# Patient Record
Sex: FEMALE | Race: WHITE | NOT HISPANIC OR LATINO | Employment: FULL TIME | URBAN - METROPOLITAN AREA
[De-identification: names, ages, dates, MRNs, and addresses within clinical notes are randomized per-mention and may not be internally consistent; named-entity substitution may affect disease eponyms.]

---

## 2018-03-09 ENCOUNTER — OFFICE VISIT (OUTPATIENT)
Dept: FAMILY MEDICINE | Facility: CLINIC | Age: 38
End: 2018-03-09
Payer: COMMERCIAL

## 2018-03-09 VITALS
HEIGHT: 63 IN | WEIGHT: 142 LBS | HEART RATE: 80 BPM | DIASTOLIC BLOOD PRESSURE: 66 MMHG | TEMPERATURE: 98.2 F | SYSTOLIC BLOOD PRESSURE: 118 MMHG | OXYGEN SATURATION: 99 % | BODY MASS INDEX: 25.16 KG/M2

## 2018-03-09 DIAGNOSIS — J18.9 PNEUMONIA OF LEFT LOWER LOBE DUE TO INFECTIOUS ORGANISM: Primary | ICD-10-CM

## 2018-03-09 PROCEDURE — 99213 OFFICE O/P EST LOW 20 MIN: CPT | Performed by: PHYSICIAN ASSISTANT

## 2018-03-09 RX ORDER — AZITHROMYCIN 250 MG/1
TABLET, FILM COATED ORAL
Qty: 6 TABLET | Refills: 0 | Status: SHIPPED | OUTPATIENT
Start: 2018-03-09 | End: 2018-11-13

## 2018-03-09 RX ORDER — ALBUTEROL SULFATE 90 UG/1
2 AEROSOL, METERED RESPIRATORY (INHALATION) EVERY 6 HOURS PRN
Qty: 1 INHALER | Refills: 0 | Status: SHIPPED | OUTPATIENT
Start: 2018-03-09 | End: 2018-11-13

## 2018-03-09 NOTE — NURSING NOTE
"Chief Complaint   Patient presents with     Cough       Initial /66  Pulse 80  Temp 98.2  F (36.8  C) (Tympanic)  Ht 5' 3\" (1.6 m)  Wt 142 lb (64.4 kg)  LMP 12/17/2013  SpO2 99%  BMI 25.15 kg/m2 Estimated body mass index is 25.15 kg/(m^2) as calculated from the following:    Height as of this encounter: 5' 3\" (1.6 m).    Weight as of this encounter: 142 lb (64.4 kg).  Medication Reconciliation: complete     Chuy Sr CMA    "

## 2018-03-09 NOTE — MR AVS SNAPSHOT
"              After Visit Summary   3/9/2018    Martha Cruz    MRN: [id number]           Patient Information     Date Of Birth          1980        Visit Information        Provider Department      3/9/2018 1:00 PM Shawna Lugo PA-C [facility]        Today's Diagnoses     Pneumonia of left lower lobe due to infectious organism (H)    -  1       Follow-ups after your visit        Who to contact     Normal or non-critical lab and imaging results will be communicated to you by mGaadihart, letter or phone within 4 business days after the clinic has received the results. If you do not hear from us within 7 days, please contact the clinic through [employer] Direct[employer] or phone. If you have a critical or abnormal lab result, we will notify you by phone as soon as possible.  Submit refill requests through Codewars or call your pharmacy and they will forward the refill request to us. Please allow 3 business days for your refill to be completed.          If you need to speak with a  for additional information , please call: [phone]             Additional Information About Your Visit        mGaadiharShweeb Information     Codewars gives you secure access to your electronic health record. If you see a primary care provider, you can also send messages to your care team and make appointments. If you have questions, please call your primary care clinic.  If you do not have a primary care provider, please call [phone] and they will assist you.        Care EveryWhere ID     This is your Care EveryWhere ID. This could be used by other organizations to access your [facility] medical records  [id number]        Your Vitals Were     Pulse Temperature Height Last Period Pulse Oximetry BMI (Body Mass Index)    80 98.2  F (36.8  C) (Tympanic) 5' 3\" (1.6 m) 12/17/2013 99% 25.15 kg/m2       Blood Pressure from Last 3 Encounters:   03/09/18 118/66   12/01/16 140/89   10/31/16 130/81    Weight from Last 3 " Encounters:   03/09/18 142 lb (64.4 kg)   12/01/16 132 lb (59.9 kg)   10/31/16 130 lb 3.2 oz (59.1 kg)              Today, you had the following     No orders found for display         Today's Medication Changes          These changes are accurate as of 3/9/18  1:19 PM.  If you have any questions, ask your nurse or doctor.               Start taking these medicines.        Dose/Directions    albuterol 108 (90 BASE) MCG/ACT Inhaler   Commonly known as:  PROAIR HFA/PROVENTIL HFA/VENTOLIN HFA   Used for:  Pneumonia of left lower lobe due to infectious organism (H)   Started by:  Shawna Lugo PA-C        Dose:  2 puff   Inhale 2 puffs into the lungs every 6 hours as needed for shortness of breath / dyspnea or wheezing   Quantity:  1 Inhaler   Refills:  0       azithromycin 250 MG tablet   Commonly known as:  ZITHROMAX   Used for:  Pneumonia of left lower lobe due to infectious organism (H)   Started by:  Shawna Lugo PA-C        Two tablets first day, then one tablet daily for four days.   Quantity:  6 tablet   Refills:  0            Where to get your medicines      These medications were sent to [facility] [address] [address] [facility]  [address] [facility] [address]     Phone:  [phone]     albuterol 108 (90 BASE) MCG/ACT Inhaler    azithromycin 250 MG tablet                Primary Care Provider Office Phone # Fax #    [facility] [phone]       [address] [facility] [address]        Equal Access to Services     MARY BENTON AH: Hadii aad ku hadasho Soomaali, waaxda luqadaha, qaybta kaalmada adeegyada, dexter myrick. So [facility] [phone].    ATENCIÓN: Si habla español, tiene a espino disposición servicios gratuitos de asistencia lingüística. Llame al [phone].    We comply with applicable federal civil rights laws and [place] laws. We do not discriminate on the basis of race, color, national  origin, age, disability, sex, sexual orientation, or gender identity.            Thank you!     Thank you for choosing [facility]  for your care. Our goal is always to provide you with excellent care. Hearing back from our patients is one way we can continue to improve our services. Please take a few minutes to complete the written survey that you may receive in the mail after your visit with us. Thank you!             Your Updated Medication List - Protect others around you: Learn how to safely use, store and throw away your medicines at www.disposemymeds.org.          This list is accurate as of 3/9/18  1:19 PM.  Always use your most recent med list.                   Brand Name Dispense Instructions for use Diagnosis    albuterol 108 (90 BASE) MCG/ACT Inhaler    PROAIR HFA/PROVENTIL HFA/VENTOLIN HFA    1 Inhaler    Inhale 2 puffs into the lungs every 6 hours as needed for shortness of breath / dyspnea or wheezing    Pneumonia of left lower lobe due to infectious organism (H)       azithromycin 250 MG tablet    ZITHROMAX    6 tablet    Two tablets first day, then one tablet daily for four days.    Pneumonia of left lower lobe due to infectious organism (H)

## 2018-03-09 NOTE — PROGRESS NOTES
"  SUBJECTIVE:   Martha Cruz is a 37 year old female who presents to clinic today for the following health issues:      ENT Symptoms             Symptoms: cc Present Absent Comment   Fever/Chills   x A few days ago, fever and chills    Fatigue  x     Muscle Aches   x    Eye Irritation   x    Sneezing  x     Nasal Morales/Drg  x  On and off    Sinus Pressure/Pain   x    Loss of smell   x    Dental pain   x    Sore Throat  x     Swollen Glands   x    Ear Pain/Fullness   x    Cough  x     Wheeze  x     Chest Pain   x Burning    Shortness of breath  x     Rash   x    Other  x  Headache      Symptom duration:  Wednesday 3/7   Symptom severity:  moderate    Treatments tried:  Advil and Advil PM    Contacts:  None       Hit acutely on Tuesday morning  Temp 101  Body aches  Cough - although not severe  Chest feels like it's \"burning\"   Trying to rest as much as possible but had an 11 hour surgery the other day      Problem list and histories reviewed & adjusted, as indicated.  Additional history: as documented    No current outpatient prescriptions on file.     Allergies   Allergen Reactions     Nkda [No Known Drug Allergies]        Reviewed and updated as needed this visit by clinical staff       Reviewed and updated as needed this visit by Provider         ROS:  Remainder of ROS obtained and found to be negative other than that which was documented above      OBJECTIVE:     /66  Pulse 80  Temp 98.2  F (36.8  C) (Tympanic)  Ht 5' 3\" (1.6 m)  Wt 142 lb (64.4 kg)  LMP 12/17/2013  SpO2 99%  BMI 25.15 kg/m2  Body mass index is 25.15 kg/(m^2).  GENERAL: healthy, alert and no distress  EYES: Eyes grossly normal to inspection  HENT: ear canals and TM's normal, nose and mouth without ulcers or lesions  NECK: no adenopathy  RESP: fine crackles noted in the LLL, otherwise good breath sounds throughout  CV: regular rates and rhythm, normal S1 S2, no S3 or S4 and no murmur, click or rub    Diagnostic Test Results:  none "     ASSESSMENT/PLAN:     (J18.1) Pneumonia of left lower lobe due to infectious organism (H)  (primary encounter diagnosis)  Comment:   Plan: azithromycin (ZITHROMAX) 250 MG tablet,         albuterol (PROAIR HFA/PROVENTIL HFA/VENTOLIN         HFA) 108 (90 BASE) MCG/ACT Inhaler                Shawna Lugo PA-C  [facility]

## 2018-11-12 ASSESSMENT — ENCOUNTER SYMPTOMS
HEMATOCHEZIA: 0
NERVOUS/ANXIOUS: 1
HEADACHES: 1
SORE THROAT: 0
FEVER: 0
ABDOMINAL PAIN: 0
SHORTNESS OF BREATH: 0
ARTHRALGIAS: 0
BREAST MASS: 1
HEARTBURN: 1
DIARRHEA: 0
CONSTIPATION: 0
COUGH: 0
NAUSEA: 0
JOINT SWELLING: 0
CHILLS: 0
FREQUENCY: 0
DIZZINESS: 1
MYALGIAS: 0
PALPITATIONS: 0
HEMATURIA: 0
EYE PAIN: 0
WEAKNESS: 0
PARESTHESIAS: 0

## 2018-11-13 ENCOUNTER — OFFICE VISIT (OUTPATIENT)
Dept: FAMILY MEDICINE | Facility: CLINIC | Age: 38
End: 2018-11-13
Payer: COMMERCIAL

## 2018-11-13 VITALS
BODY MASS INDEX: 25.55 KG/M2 | TEMPERATURE: 97.3 F | WEIGHT: 144.2 LBS | HEART RATE: 64 BPM | DIASTOLIC BLOOD PRESSURE: 84 MMHG | SYSTOLIC BLOOD PRESSURE: 132 MMHG | HEIGHT: 63 IN

## 2018-11-13 DIAGNOSIS — H91.90 DECREASED HEARING, UNSPECIFIED LATERALITY: Primary | ICD-10-CM

## 2018-11-13 DIAGNOSIS — R12 HEARTBURN: ICD-10-CM

## 2018-11-13 DIAGNOSIS — N64.59 BREAST THICKENING: ICD-10-CM

## 2018-11-13 DIAGNOSIS — Z13.6 CARDIOVASCULAR SCREENING; LDL GOAL LESS THAN 160: ICD-10-CM

## 2018-11-13 DIAGNOSIS — Z00.01 ENCOUNTER FOR ROUTINE ADULT MEDICAL EXAM WITH ABNORMAL FINDINGS: ICD-10-CM

## 2018-11-13 LAB
CHOLEST SERPL-MCNC: 206 MG/DL
HDLC SERPL-MCNC: 59 MG/DL
LDLC SERPL CALC-MCNC: 132 MG/DL
NONHDLC SERPL-MCNC: 147 MG/DL
TRIGL SERPL-MCNC: 75 MG/DL

## 2018-11-13 PROCEDURE — 99213 OFFICE O/P EST LOW 20 MIN: CPT | Mod: 25 | Performed by: FAMILY MEDICINE

## 2018-11-13 PROCEDURE — 99396 PREV VISIT EST AGE 40-64: CPT | Performed by: FAMILY MEDICINE

## 2018-11-13 PROCEDURE — 80061 LIPID PANEL: CPT | Performed by: FAMILY MEDICINE

## 2018-11-13 PROCEDURE — 36415 COLL VENOUS BLD VENIPUNCTURE: CPT | Performed by: FAMILY MEDICINE

## 2018-11-13 ASSESSMENT — PAIN SCALES - GENERAL: PAINLEVEL: NO PAIN (0)

## 2018-11-13 NOTE — PATIENT INSTRUCTIONS
Preventive Health Recommendations  Female Ages 40 to 49    Yearly exam:     See your health care provider every year in order to  1. Review health changes.   2. Discuss preventive care.    3. Review your medicines if your doctor prescribed any.      Get a Pap test every three years (unless you have an abnormal result and your provider advises testing more often).      If you get Pap tests with HPV test, you only need to test every 5 years, unless you have an abnormal result. You do not need a Pap test if your uterus was removed (hysterectomy) and you have not had cancer.      You should be tested each year for STDs (sexually transmitted diseases), if you're at risk.     Ask your doctor if you should have a mammogram.      Have a colonoscopy (test for colon cancer) if someone in your family has had colon cancer or polyps before age 50.       Have a cholesterol test every 5 years.       Have a diabetes test (fasting glucose) after age 45. If you are at risk for diabetes, you should have this test every 3 years.    Shots: Get a flu shot each year. Get a tetanus shot every 10 years.     Nutrition:     Eat at least 5 servings of fruits and vegetables each day.    Eat whole-grain bread, whole-wheat pasta and brown rice instead of white grains and rice.    Get adequate Calcium and Vitamin D.      Lifestyle    Exercise at least 150 minutes a week (an average of 30 minutes a day, 5 days a week). This will help you control your weight and prevent disease.    Limit alcohol to one drink per day.    No smoking.     Wear sunscreen to prevent skin cancer.    See your dentist every six months for an exam and cleaning.        Make the appointment for the breast exams  You should know the same day what the next steps are  Call for the audiology appointment and wear the ear protection to keep the hearing you have.

## 2018-11-13 NOTE — MR AVS SNAPSHOT
After Visit Summary   11/13/2018    Martha Cruz    MRN: [id number]           Patient Information     Date Of Birth          1980        Visit Information        Provider Department      11/13/2018 8:00 AM Amairani Sanchez MD [facility]        Today's Diagnoses     Decreased hearing, unspecified laterality    -  1    Routine general medical examination at a health care facility        Encounter for routine adult medical exam with abnormal findings        CARDIOVASCULAR SCREENING; LDL GOAL LESS THAN 160        Breast thickening          Care Instructions      Preventive Health Recommendations  Female Ages 40 to 49    Yearly exam:     See your health care provider every year in order to  1. Review health changes.   2. Discuss preventive care.    3. Review your medicines if your doctor prescribed any.      Get a Pap test every three years (unless you have an abnormal result and your provider advises testing more often).      If you get Pap tests with HPV test, you only need to test every 5 years, unless you have an abnormal result. You do not need a Pap test if your uterus was removed (hysterectomy) and you have not had cancer.      You should be tested each year for STDs (sexually transmitted diseases), if you're at risk.     Ask your doctor if you should have a mammogram.      Have a colonoscopy (test for colon cancer) if someone in your family has had colon cancer or polyps before age 50.       Have a cholesterol test every 5 years.       Have a diabetes test (fasting glucose) after age 45. If you are at risk for diabetes, you should have this test every 3 years.    Shots: Get a flu shot each year. Get a tetanus shot every 10 years.     Nutrition:     Eat at least 5 servings of fruits and vegetables each day.    Eat whole-grain bread, whole-wheat pasta and brown rice instead of white grains and rice.    Get adequate Calcium and Vitamin D.      Lifestyle    Exercise at least 150  minutes a week (an average of 30 minutes a day, 5 days a week). This will help you control your weight and prevent disease.    Limit alcohol to one drink per day.    No smoking.     Wear sunscreen to prevent skin cancer.    See your dentist every six months for an exam and cleaning.        Make the appointment for the breast exams  You should know the same day what the next steps are  Call for the audiology appointment and wear the ear protection to keep the hearing you have.           Follow-ups after your visit        Additional Services     AUDIOLOGY ADULT REFERRAL       Your provider has referred you to: [facility] [phone]   [email or web address]    Specialty Testing:  Hearing Aid Consultation            OTOLARYNGOLOGY REFERRAL       Your provider has referred you to: [facility]: [facility] [phone]   [email or web address]    Please be aware that coverage of these services is subject to the terms and limitations of your health insurance plan.  Call member services at your health plan with any benefit or coverage questions.      Please bring the following with you to your appointment:    (1) Any X-Rays, CTs or MRIs which have been performed.  Contact the facility where they were done to arrange for  prior to your scheduled appointment.   (2) List of current medications  (3) This referral request   (4) Any documents/labs given to you for this referral                  Future tests that were ordered for you today     Open Future Orders        Priority Expected Expires Ordered    US Breast Left Complete 4 Quadrants Routine  11/13/2019 11/13/2018    MA Diagnostic Digital Bilateral Routine  11/13/2019 11/13/2018            Who to contact     Normal or non-critical lab and imaging results will be communicated to you by MyChart, letter or phone within 4 business days after the clinic has received the results.  "If you do not hear from us within 7 days, please contact the clinic through [employer] or phone. If you have a critical or abnormal lab result, we will notify you by phone as soon as possible.  Submit refill requests through [employer]ComLink or call your pharmacy and they will forward the refill request to us. Please allow 3 business days for your refill to be completed.          If you need to speak with a  for additional information , please call: [phone]             Additional Information About Your Visit        NewComLink Information     [employer]Link gives you secure access to your electronic health record. If you see a primary care provider, you can also send messages to your care team and make appointments. If you have questions, please call your primary care clinic.  If you do not have a primary care provider, please call [phone] and they will assist you.        Care EveryWhere ID     This is your Care EveryWhere ID. This could be used by other organizations to access your [facility] medical records  [id number]        Your Vitals Were     Pulse Temperature Height Last Period BMI (Body Mass Index)       64 97.3  F (36.3  C) (Tympanic) 5' 3\" (1.6 m) 12/17/2013 25.54 kg/m2        Blood Pressure from Last 3 Encounters:   11/13/18 132/84   03/09/18 118/66   12/01/16 140/89    Weight from Last 3 Encounters:   11/13/18 144 lb 3.2 oz (65.4 kg)   03/09/18 142 lb (64.4 kg)   12/01/16 132 lb (59.9 kg)              We Performed the Following     AUDIOLOGY ADULT REFERRAL     Lipid panel reflex to direct LDL Fasting     OTOLARYNGOLOGY REFERRAL        Primary Care Provider Office Phone # Fax #    [facility] [phone] [address] [facility] [address]        Equal Access to Services     MARY BENTON : Nancy Lomax, wakelly coker, qaybta kaalmamelba florentino, dexter myrick. So [facility] [phone].    ATENCIÓN: Si adonay nicholas " disposición servicios gratuitos de asistencia lingüística. Violetta rodriguez [phone].    We comply with applicable federal civil rights laws and [place] laws. We do not discriminate on the basis of race, color, national origin, age, disability, sex, sexual orientation, or gender identity.            Thank you!     Thank you for choosing [facility]  for your care. Our goal is always to provide you with excellent care. Hearing back from our patients is one way we can continue to improve our services. Please take a few minutes to complete the written survey that you may receive in the mail after your visit with us. Thank you!             Your Updated Medication List - Protect others around you: Learn how to safely use, store and throw away your medicines at www.disposemymeds.org.      Notice  As of 11/13/2018  8:31 AM    You have not been prescribed any medications.
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
## 2018-11-14 NOTE — PROGRESS NOTES
Martha,  Your lab results were normal your bad cholesterol is ok and the good is ok too. Please feel free to my chart or call the office with questions. Amairani Sanchez M.D.

## 2019-01-02 ENCOUNTER — HOSPITAL ENCOUNTER (OUTPATIENT)
Dept: ULTRASOUND IMAGING | Facility: CLINIC | Age: 39
End: 2019-01-02
Attending: FAMILY MEDICINE
Payer: COMMERCIAL

## 2019-01-02 ENCOUNTER — HOSPITAL ENCOUNTER (OUTPATIENT)
Dept: MAMMOGRAPHY | Facility: CLINIC | Age: 39
Discharge: HOME | End: 2019-01-02
Attending: FAMILY MEDICINE | Admitting: FAMILY MEDICINE
Payer: COMMERCIAL

## 2019-01-02 DIAGNOSIS — N64.59 BREAST THICKENING: ICD-10-CM

## 2019-01-02 PROCEDURE — 76642 ULTRASOUND BREAST LIMITED: CPT | Mod: LT

## 2019-01-02 PROCEDURE — 77066 DX MAMMO INCL CAD BI: CPT

## 2019-01-02 PROCEDURE — G0279 TOMOSYNTHESIS, MAMMO: HCPCS

## 2019-06-26 ENCOUNTER — APPOINTMENT (OUTPATIENT)
Dept: CT IMAGING | Facility: CLINIC | Age: 39
End: 2019-06-26
Attending: EMERGENCY MEDICINE
Payer: COMMERCIAL

## 2019-06-26 ENCOUNTER — HOSPITAL ENCOUNTER (EMERGENCY)
Facility: CLINIC | Age: 39
Discharge: HOME | End: 2019-06-26
Attending: EMERGENCY MEDICINE | Admitting: EMERGENCY MEDICINE
Payer: COMMERCIAL

## 2019-06-26 ENCOUNTER — APPOINTMENT (OUTPATIENT)
Dept: MRI IMAGING | Facility: CLINIC | Age: 39
End: 2019-06-26
Attending: EMERGENCY MEDICINE
Payer: COMMERCIAL

## 2019-06-26 VITALS
SYSTOLIC BLOOD PRESSURE: 132 MMHG | DIASTOLIC BLOOD PRESSURE: 82 MMHG | HEART RATE: 83 BPM | RESPIRATION RATE: 18 BRPM | OXYGEN SATURATION: 98 % | TEMPERATURE: 100 F

## 2019-06-26 DIAGNOSIS — R42 LIGHTHEADEDNESS: ICD-10-CM

## 2019-06-26 DIAGNOSIS — H53.9 VISUAL DISTURBANCE: ICD-10-CM

## 2019-06-26 DIAGNOSIS — R90.89 ABNORMAL BRAIN MRI: ICD-10-CM

## 2019-06-26 LAB
ANION GAP SERPL CALCULATED.3IONS-SCNC: 6 MMOL/L (ref 3–14)
BASOPHILS # BLD AUTO: 0 10E9/L (ref 0–0.2)
BASOPHILS NFR BLD AUTO: 0.6 %
BUN SERPL-MCNC: 14 MG/DL (ref 7–30)
CALCIUM SERPL-MCNC: 8.8 MG/DL (ref 8.5–10.1)
CHLORIDE SERPL-SCNC: 109 MMOL/L (ref 94–109)
CO2 SERPL-SCNC: 25 MMOL/L (ref 20–32)
CREAT SERPL-MCNC: 0.63 MG/DL (ref 0.52–1.04)
DIFFERENTIAL METHOD BLD: NORMAL
EOSINOPHIL # BLD AUTO: 0.1 10E9/L (ref 0–0.7)
EOSINOPHIL NFR BLD AUTO: 1 %
ERYTHROCYTE [DISTWIDTH] IN BLOOD BY AUTOMATED COUNT: 13 % (ref 10–15)
GFR SERPL CREATININE-BSD FRML MDRD: >90 ML/MIN/{1.73_M2}
GLUCOSE SERPL-MCNC: 106 MG/DL (ref 70–99)
HCG UR QL: NEGATIVE
HCT VFR BLD AUTO: 41.1 % (ref 35–47)
HGB BLD-MCNC: 13.6 G/DL (ref 11.7–15.7)
IMM GRANULOCYTES # BLD: 0 10E9/L (ref 0–0.4)
IMM GRANULOCYTES NFR BLD: 0.2 %
LYMPHOCYTES # BLD AUTO: 1.2 10E9/L (ref 0.8–5.3)
LYMPHOCYTES NFR BLD AUTO: 25 %
MCH RBC QN AUTO: 30.3 PG (ref 26.5–33)
MCHC RBC AUTO-ENTMCNC: 33.1 G/DL (ref 31.5–36.5)
MCV RBC AUTO: 92 FL (ref 78–100)
MONOCYTES # BLD AUTO: 0.5 10E9/L (ref 0–1.3)
MONOCYTES NFR BLD AUTO: 9.3 %
NEUTROPHILS # BLD AUTO: 3.1 10E9/L (ref 1.6–8.3)
NEUTROPHILS NFR BLD AUTO: 63.9 %
NRBC # BLD AUTO: 0 10*3/UL
NRBC BLD AUTO-RTO: 0 /100
PLATELET # BLD AUTO: 235 10E9/L (ref 150–450)
POTASSIUM SERPL-SCNC: 3.8 MMOL/L (ref 3.4–5.3)
RBC # BLD AUTO: 4.49 10E12/L (ref 3.8–5.2)
SODIUM SERPL-SCNC: 140 MMOL/L (ref 133–144)
WBC # BLD AUTO: 4.8 10E9/L (ref 4–11)

## 2019-06-26 PROCEDURE — 96360 HYDRATION IV INFUSION INIT: CPT | Mod: 59 | Performed by: EMERGENCY MEDICINE

## 2019-06-26 PROCEDURE — 80048 BASIC METABOLIC PNL TOTAL CA: CPT | Performed by: EMERGENCY MEDICINE

## 2019-06-26 PROCEDURE — 93005 ELECTROCARDIOGRAM TRACING: CPT | Performed by: EMERGENCY MEDICINE

## 2019-06-26 PROCEDURE — 93010 ELECTROCARDIOGRAM REPORT: CPT | Mod: Z6 | Performed by: EMERGENCY MEDICINE

## 2019-06-26 PROCEDURE — 70553 MRI BRAIN STEM W/O & W/DYE: CPT

## 2019-06-26 PROCEDURE — A9585 GADOBUTROL INJECTION: HCPCS | Performed by: EMERGENCY MEDICINE

## 2019-06-26 PROCEDURE — 70498 CT ANGIOGRAPHY NECK: CPT

## 2019-06-26 PROCEDURE — 70450 CT HEAD/BRAIN W/O DYE: CPT | Mod: XS

## 2019-06-26 PROCEDURE — 25000128 H RX IP 250 OP 636: Performed by: EMERGENCY MEDICINE

## 2019-06-26 PROCEDURE — 85025 COMPLETE CBC W/AUTO DIFF WBC: CPT | Performed by: EMERGENCY MEDICINE

## 2019-06-26 PROCEDURE — 25000125 ZZHC RX 250: Performed by: EMERGENCY MEDICINE

## 2019-06-26 PROCEDURE — 25500064 ZZH RX 255 OP 636: Performed by: EMERGENCY MEDICINE

## 2019-06-26 PROCEDURE — 81025 URINE PREGNANCY TEST: CPT | Performed by: EMERGENCY MEDICINE

## 2019-06-26 PROCEDURE — 25000132 ZZH RX MED GY IP 250 OP 250 PS 637: Performed by: EMERGENCY MEDICINE

## 2019-06-26 PROCEDURE — 99285 EMERGENCY DEPT VISIT HI MDM: CPT | Mod: 25 | Performed by: EMERGENCY MEDICINE

## 2019-06-26 RX ORDER — GADOBUTROL 604.72 MG/ML
6 INJECTION INTRAVENOUS ONCE
Status: COMPLETED | OUTPATIENT
Start: 2019-06-26 | End: 2019-06-26

## 2019-06-26 RX ORDER — ACETAMINOPHEN 325 MG/1
650 TABLET ORAL ONCE
Status: COMPLETED | OUTPATIENT
Start: 2019-06-26 | End: 2019-06-26

## 2019-06-26 RX ORDER — IOPAMIDOL 755 MG/ML
70 INJECTION, SOLUTION INTRAVASCULAR ONCE
Status: COMPLETED | OUTPATIENT
Start: 2019-06-26 | End: 2019-06-26

## 2019-06-26 RX ADMIN — SODIUM CHLORIDE 1000 ML: 9 INJECTION, SOLUTION INTRAVENOUS at 10:15

## 2019-06-26 RX ADMIN — IOPAMIDOL 70 ML: 755 INJECTION, SOLUTION INTRAVENOUS at 10:47

## 2019-06-26 RX ADMIN — ACETAMINOPHEN 650 MG: 325 TABLET, FILM COATED ORAL at 11:39

## 2019-06-26 RX ADMIN — GADOBUTROL 6 ML: 604.72 INJECTION INTRAVENOUS at 12:20

## 2019-06-26 RX ADMIN — SODIUM CHLORIDE 100 ML: 9 INJECTION, SOLUTION INTRAVENOUS at 10:47

## 2019-06-26 ASSESSMENT — ENCOUNTER SYMPTOMS
RESPIRATORY NEGATIVE: 1
NUMBNESS: 1
PSYCHIATRIC NEGATIVE: 1
EYES NEGATIVE: 1
CARDIOVASCULAR NEGATIVE: 1
ALLERGIC/IMMUNOLOGIC NEGATIVE: 1
MUSCULOSKELETAL NEGATIVE: 1
HEMATOLOGIC/LYMPHATIC NEGATIVE: 1
ENDOCRINE NEGATIVE: 1
HEADACHES: 1
CONSTITUTIONAL NEGATIVE: 1
GASTROINTESTINAL NEGATIVE: 1
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
## 2019-06-26 NOTE — ED AVS SNAPSHOT
[facility] Emergency Department  [address] [address]  Phone:  [phone]  Fax:  [phone]                                    Martha Cruz   MRN: [id number]    Department:  [facility] Emergency Department   Date of Visit:  6/26/2019           After Visit Summary Signature Page    I have received my discharge instructions, and my questions have been answered. I have discussed any challenges I see with this plan with the nurse or doctor.    ..........................................................................................................................................  Patient/Patient Representative Signature      ..........................................................................................................................................  Patient Representative Print Name and Relationship to Patient    ..................................................               ................................................  Date                                   Time    ..........................................................................................................................................  Reviewed by Signature/Title    ...................................................              ..............................................  Date                                               Time          22EPIC Rev 08/18

## 2019-06-26 NOTE — ED NOTES
Driving to work at 0710 and felt lightheaded-arms weak and hard to control-loss of vision -these sx lasted 10 seconds-also felt tingly all over-now left facial tingling and feels faint with quick movements

## 2019-06-26 NOTE — LETTER
June 26, 2019      To Whom It May Concern:      Martha Cruz was seen in our Emergency Department today, 06/26/19.  Please excuse her from missing work due to her visit in the emergency department.  Follow-up care with her primary care and treating provider is recommended.  If her symptoms recur or worsen she is to return to the emergency department for reevaluation additional care      Sincerely,      Christos Varela MD, FACEP

## 2019-07-02 ENCOUNTER — TRANSFERRED RECORDS (OUTPATIENT)
Dept: HEALTH INFORMATION MANAGEMENT | Facility: CLINIC | Age: 39
End: 2019-07-02

## 2019-07-16 ENCOUNTER — TRANSFERRED RECORDS (OUTPATIENT)
Dept: HEALTH INFORMATION MANAGEMENT | Facility: CLINIC | Age: 39
End: 2019-07-16

## 2019-08-23 ENCOUNTER — AMBULATORY - HEALTHEAST (OUTPATIENT)
Dept: NEUROSURGERY | Facility: CLINIC | Age: 39
End: 2019-08-23

## 2019-08-23 ENCOUNTER — RECORDS - HEALTHEAST (OUTPATIENT)
Dept: RADIOLOGY | Facility: CLINIC | Age: 39
End: 2019-08-23

## 2019-08-23 ENCOUNTER — RECORDS - HEALTHEAST (OUTPATIENT)
Dept: ADMINISTRATIVE | Facility: OTHER | Age: 39
End: 2019-08-23

## 2019-08-27 ENCOUNTER — OFFICE VISIT - HEALTHEAST (OUTPATIENT)
Dept: NEUROSURGERY | Facility: CLINIC | Age: 39
End: 2019-08-27

## 2019-08-27 ENCOUNTER — TRANSFERRED RECORDS (OUTPATIENT)
Dept: HEALTH INFORMATION MANAGEMENT | Facility: CLINIC | Age: 39
End: 2019-08-27

## 2019-08-27 DIAGNOSIS — Q28.3 CAVERNOUS MALFORMATION: ICD-10-CM

## 2019-08-27 ASSESSMENT — MIFFLIN-ST. JEOR: SCORE: 1270.97

## 2019-10-09 ENCOUNTER — TRANSFERRED RECORDS (OUTPATIENT)
Dept: HEALTH INFORMATION MANAGEMENT | Facility: CLINIC | Age: 39
End: 2019-10-09

## 2019-11-06 ENCOUNTER — MYC MEDICAL ADVICE (OUTPATIENT)
Dept: FAMILY MEDICINE | Facility: CLINIC | Age: 39
End: 2019-11-06

## 2020-01-16 ENCOUNTER — OFFICE VISIT (OUTPATIENT)
Dept: FAMILY MEDICINE | Facility: CLINIC | Age: 40
End: 2020-01-16
Payer: COMMERCIAL

## 2020-01-16 VITALS
DIASTOLIC BLOOD PRESSURE: 86 MMHG | SYSTOLIC BLOOD PRESSURE: 128 MMHG | WEIGHT: 153.7 LBS | HEART RATE: 78 BPM | HEIGHT: 63 IN | OXYGEN SATURATION: 99 % | BODY MASS INDEX: 27.23 KG/M2 | TEMPERATURE: 98.1 F

## 2020-01-16 DIAGNOSIS — Q28.3 CEREBRAL CAVERNOMA: ICD-10-CM

## 2020-01-16 DIAGNOSIS — Z00.00 ROUTINE GENERAL MEDICAL EXAMINATION AT A HEALTH CARE FACILITY: Primary | ICD-10-CM

## 2020-01-16 PROCEDURE — 99396 PREV VISIT EST AGE 40-64: CPT | Performed by: FAMILY MEDICINE

## 2020-01-16 ASSESSMENT — MIFFLIN-ST. JEOR: SCORE: 1331.31
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
## 2020-02-06 ENCOUNTER — HOSPITAL ENCOUNTER (OUTPATIENT)
Dept: MAMMOGRAPHY | Facility: CLINIC | Age: 40
Discharge: HOME | End: 2020-02-06
Attending: FAMILY MEDICINE | Admitting: FAMILY MEDICINE
Payer: COMMERCIAL

## 2020-02-06 DIAGNOSIS — Z12.31 VISIT FOR SCREENING MAMMOGRAM: ICD-10-CM

## 2020-02-06 PROCEDURE — 77063 BREAST TOMOSYNTHESIS BI: CPT

## 2020-05-13 ENCOUNTER — TRANSFERRED RECORDS (OUTPATIENT)
Dept: HEALTH INFORMATION MANAGEMENT | Facility: CLINIC | Age: 40
End: 2020-05-13

## 2020-05-21 ENCOUNTER — TRANSFERRED RECORDS (OUTPATIENT)
Dept: HEALTH INFORMATION MANAGEMENT | Facility: CLINIC | Age: 40
End: 2020-05-21

## 2020-06-05 ENCOUNTER — TELEPHONE (OUTPATIENT)
Dept: DERMATOLOGY | Facility: CLINIC | Age: 40
End: 2020-06-05

## 2020-06-05 DIAGNOSIS — B86 SCABIES: Primary | ICD-10-CM

## 2020-06-05 RX ORDER — PERMETHRIN 50 MG/G
CREAM TOPICAL ONCE
Qty: 120 G | Refills: 0 | Status: SHIPPED | OUTPATIENT
Start: 2020-06-05 | End: 2020-06-05

## 2020-12-06 ENCOUNTER — HEALTH MAINTENANCE LETTER (OUTPATIENT)
Age: 40
End: 2020-12-06

## 2021-02-02 ASSESSMENT — PATIENT HEALTH QUESTIONNAIRE - PHQ9
10. IF YOU CHECKED OFF ANY PROBLEMS, HOW DIFFICULT HAVE THESE PROBLEMS MADE IT FOR YOU TO DO YOUR WORK, TAKE CARE OF THINGS AT HOME, OR GET ALONG WITH OTHER PEOPLE: SOMEWHAT DIFFICULT
SUM OF ALL RESPONSES TO PHQ QUESTIONS 1-9: 9
SUM OF ALL RESPONSES TO PHQ QUESTIONS 1-9: 9

## 2021-02-02 ASSESSMENT — ENCOUNTER SYMPTOMS
HEARTBURN: 1
WEAKNESS: 0
FEVER: 0
ABDOMINAL PAIN: 0
DIZZINESS: 0
JOINT SWELLING: 0
HEMATURIA: 0
NERVOUS/ANXIOUS: 1
FREQUENCY: 0
COUGH: 0
SHORTNESS OF BREATH: 0
CHILLS: 0
MYALGIAS: 0
PARESTHESIAS: 0
DIARRHEA: 0
EYE PAIN: 0
SORE THROAT: 0
DYSURIA: 0
NAUSEA: 0
HEADACHES: 1
ARTHRALGIAS: 1
HEMATOCHEZIA: 0
CONSTIPATION: 0
PALPITATIONS: 0
BREAST MASS: 0

## 2021-02-03 ASSESSMENT — PATIENT HEALTH QUESTIONNAIRE - PHQ9: SUM OF ALL RESPONSES TO PHQ QUESTIONS 1-9: 9

## 2021-02-09 ENCOUNTER — OFFICE VISIT (OUTPATIENT)
Dept: FAMILY MEDICINE | Facility: CLINIC | Age: 41
End: 2021-02-09
Payer: COMMERCIAL

## 2021-02-09 VITALS
OXYGEN SATURATION: 99 % | WEIGHT: 156 LBS | HEIGHT: 63 IN | BODY MASS INDEX: 27.64 KG/M2 | DIASTOLIC BLOOD PRESSURE: 97 MMHG | TEMPERATURE: 98.2 F | SYSTOLIC BLOOD PRESSURE: 147 MMHG | HEART RATE: 92 BPM

## 2021-02-09 DIAGNOSIS — R12 HEARTBURN: ICD-10-CM

## 2021-02-09 DIAGNOSIS — F43.23 ADJUSTMENT DISORDER WITH MIXED ANXIETY AND DEPRESSED MOOD: ICD-10-CM

## 2021-02-09 DIAGNOSIS — Z00.00 ROUTINE GENERAL MEDICAL EXAMINATION AT A HEALTH CARE FACILITY: Primary | ICD-10-CM

## 2021-02-09 LAB — TSH SERPL DL<=0.005 MIU/L-ACNC: 2.08 MU/L (ref 0.4–4)

## 2021-02-09 PROCEDURE — 36415 COLL VENOUS BLD VENIPUNCTURE: CPT | Performed by: FAMILY MEDICINE

## 2021-02-09 PROCEDURE — 99396 PREV VISIT EST AGE 40-64: CPT | Performed by: FAMILY MEDICINE

## 2021-02-09 PROCEDURE — 84443 ASSAY THYROID STIM HORMONE: CPT | Performed by: FAMILY MEDICINE

## 2021-02-09 PROCEDURE — 99214 OFFICE O/P EST MOD 30 MIN: CPT | Mod: 25 | Performed by: FAMILY MEDICINE

## 2021-02-09 RX ORDER — FLUOXETINE 10 MG/1
10 CAPSULE ORAL DAILY
Qty: 30 CAPSULE | Refills: 3 | Status: SHIPPED | OUTPATIENT
Start: 2021-02-09 | End: 2021-08-02

## 2021-02-09 RX ORDER — OMEPRAZOLE 40 MG/1
40 CAPSULE, DELAYED RELEASE ORAL DAILY
Qty: 30 CAPSULE | Refills: 0 | Status: SHIPPED | OUTPATIENT
Start: 2021-02-09 | End: 2022-04-20

## 2021-02-09 ASSESSMENT — ENCOUNTER SYMPTOMS
NERVOUS/ANXIOUS: 1
SORE THROAT: 0
FREQUENCY: 0
WEAKNESS: 0
BREAST MASS: 0
SHORTNESS OF BREATH: 0
ARTHRALGIAS: 1
DYSURIA: 0
COUGH: 0
JOINT SWELLING: 0
DIZZINESS: 0
DIARRHEA: 0
CONSTIPATION: 0
ABDOMINAL PAIN: 0
PALPITATIONS: 0
HEMATOCHEZIA: 0
NAUSEA: 0
HEADACHES: 1
EYE PAIN: 0
FEVER: 0
CHILLS: 0
HEARTBURN: 1
HEMATURIA: 0
PARESTHESIAS: 0
MYALGIAS: 0

## 2021-02-09 ASSESSMENT — MIFFLIN-ST. JEOR: SCORE: 1336.74
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
## 2021-02-09 NOTE — PATIENT INSTRUCTIONS
Preventive Health Recommendations  Female Ages 40 to 49    Yearly exam:     See your health care provider every year in order to  1. Review health changes.   2. Discuss preventive care.    3. Review your medicines if your doctor prescribed any.      Get a Pap test every three years (unless you have an abnormal result and your provider advises testing more often).      If you get Pap tests with HPV test, you only need to test every 5 years, unless you have an abnormal result. You do not need a Pap test if your uterus was removed (hysterectomy) and you have not had cancer.      You should be tested each year for STDs (sexually transmitted diseases), if you're at risk.     Ask your doctor if you should have a mammogram.      Have a colonoscopy (test for colon cancer) if someone in your family has had colon cancer or polyps before age 50.       Have a cholesterol test every 5 years.       Have a diabetes test (fasting glucose) after age 45. If you are at risk for diabetes, you should have this test every 3 years.    Shots: Get a flu shot each year. Get a tetanus shot every 10 years.     Nutrition:     Eat at least 5 servings of fruits and vegetables each day.    Eat whole-grain bread, whole-wheat pasta and brown rice instead of white grains and rice.    Get adequate Calcium and Vitamin D.      Lifestyle    Exercise at least 150 minutes a week (an average of 30 minutes a day, 5 days a week). This will help you control your weight and prevent disease.    Limit alcohol to one drink per day.    No smoking.     Wear sunscreen to prevent skin cancer.    See your dentist every six months for an exam and cleaning.      Take the prilosec for 2 weeks then try taking just the famotidine

## 2021-02-10 ENCOUNTER — HOSPITAL ENCOUNTER (OUTPATIENT)
Dept: MAMMOGRAPHY | Facility: CLINIC | Age: 41
Discharge: HOME | End: 2021-02-10
Attending: FAMILY MEDICINE
Payer: COMMERCIAL

## 2021-02-10 DIAGNOSIS — Z12.31 VISIT FOR SCREENING MAMMOGRAM: ICD-10-CM

## 2021-02-10 PROCEDURE — 77063 BREAST TOMOSYNTHESIS BI: CPT

## 2021-02-11 NOTE — RESULT ENCOUNTER NOTE
Martha,  Your lab results were normal/stable. Please feel free to my chart or call the office with questions. Amairani Sanchez M.D.

## 2021-05-26 ENCOUNTER — TRANSFERRED RECORDS (OUTPATIENT)
Dept: HEALTH INFORMATION MANAGEMENT | Facility: CLINIC | Age: 41
End: 2021-05-26

## 2021-05-26 ENCOUNTER — RECORDS - HEALTHEAST (OUTPATIENT)
Dept: ADMINISTRATIVE | Facility: CLINIC | Age: 41
End: 2021-05-26

## 2021-05-31 NOTE — PROGRESS NOTES
Dear Dr. Lugo:    I had the pleasure of seeing Martha Cruz in consultation by neurosurgery clinic for headaches and recent episode of tingling in the upper left half of the body and loss of vision.  Left side of the face was numb and tingly lasted all day dull headaches or eyebrows and left eye twitching.  She had an EEG which is negative for seizures she was evaluated by her colleagues in her eye clinic and diagnosed with migraine.  On imaging she was found to have an incidental 4 mm cavernous malformation.    On physical exam patient is very pleasant and appropriate  Speech is clear fluent and appropriate  Face is symmetric throughout the forehead eyes and mouth  Extraocular muscles are intact bilaterally  Tongue and uvula elevate in the midline  Gait is nonantalgic    Assessment and plan:  Martha is a very pleasant right-handed 38-year-old with incidental finding of a cavernous malformation.  This is not the cause of her headaches and reassured them.  I reviewed the natural history of cavernous malformations I will repeat an MRI in 1 year with and without contrast.  If that stable after a year or 2 then we can discuss PRN MRIs versus every other 2 years.   Thank you for giving me the opportunity to see this very pleasant patient.  If you have any questions about Martha or any other patients please feel free to contact me.  Of note I spent greater than 30 minutes counseling the patient regarding her pathology and treatment plan.    Taisha Butt MD, [facility]

## 2021-06-03 VITALS — WEIGHT: 145 LBS | BODY MASS INDEX: 26.68 KG/M2 | HEIGHT: 62 IN

## 2021-08-02 ENCOUNTER — VIRTUAL VISIT (OUTPATIENT)
Dept: FAMILY MEDICINE | Facility: CLINIC | Age: 41
End: 2021-08-02
Payer: COMMERCIAL

## 2021-08-02 DIAGNOSIS — F43.23 ADJUSTMENT DISORDER WITH MIXED ANXIETY AND DEPRESSED MOOD: ICD-10-CM

## 2021-08-02 DIAGNOSIS — Z28.21 COVID-19 VIRUS VACCINATION DECLINED: Primary | ICD-10-CM

## 2021-08-02 PROCEDURE — 99213 OFFICE O/P EST LOW 20 MIN: CPT | Mod: 95 | Performed by: FAMILY MEDICINE

## 2021-08-02 RX ORDER — FLUOXETINE 10 MG/1
10 CAPSULE ORAL DAILY
Qty: 90 CAPSULE | Refills: 3 | Status: SHIPPED | OUTPATIENT
Start: 2021-08-02 | End: 2022-11-15

## 2021-08-02 ASSESSMENT — ANXIETY QUESTIONNAIRES
3. WORRYING TOO MUCH ABOUT DIFFERENT THINGS: SEVERAL DAYS
1. FEELING NERVOUS, ANXIOUS, OR ON EDGE: SEVERAL DAYS
GAD7 TOTAL SCORE: 3
6. BECOMING EASILY ANNOYED OR IRRITABLE: NOT AT ALL
5. BEING SO RESTLESS THAT IT IS HARD TO SIT STILL: NOT AT ALL
2. NOT BEING ABLE TO STOP OR CONTROL WORRYING: SEVERAL DAYS
IF YOU CHECKED OFF ANY PROBLEMS ON THIS QUESTIONNAIRE, HOW DIFFICULT HAVE THESE PROBLEMS MADE IT FOR YOU TO DO YOUR WORK, TAKE CARE OF THINGS AT HOME, OR GET ALONG WITH OTHER PEOPLE: NOT DIFFICULT AT ALL
7. FEELING AFRAID AS IF SOMETHING AWFUL MIGHT HAPPEN: NOT AT ALL

## 2021-08-02 ASSESSMENT — PATIENT HEALTH QUESTIONNAIRE - PHQ9
SUM OF ALL RESPONSES TO PHQ QUESTIONS 1-9: 1
5. POOR APPETITE OR OVEREATING: NOT AT ALL

## 2021-08-02 NOTE — PROGRESS NOTES
"Martha is a 40 year old who is being evaluated via a billable video visit.      How would you like to obtain your AVS?   If the video visit is dropped, the invitation should be resent by:   Will anyone else be joining your video visit? No      Video Start Time: 11:45 AM    Assessment & Plan     Adjustment disorder with mixed anxiety and depressed mood  Will continue this discussed legth of treatment, expected results and follow up   - FLUoxetine (PROZAC) 10 MG capsule; Take 1 capsule (10 mg) by mouth daily    COVID-19 virus vaccination declined  Recommended vaccination.          BMI:   Estimated body mass index is 27.63 kg/m  as calculated from the following:    Height as of 21: 1.6 m (5' 3\").    Weight as of 21: 70.8 kg (156 lb).       Patient Instructions   Take the prozac until next spring. Call if side effects or other changes   rec covid vaccine      No follow-ups on file.    Amairani Sanchez MD  [facility]    Stef Thomas is a 40 year old who presents for the following health issues     HPI     Depression and Anxiety Follow-Up    How are you doing with your depression since your last visit? Improved     How are you doing with your anxiety since your last visit?  Improved     Are you having other symptoms that might be associated with depression or anxiety? No    Have you had a significant life event? No     Do you have any concerns with your use of alcohol or other drugs? No    Social History     Tobacco Use     Smoking status: Former Smoker     Packs/day: 0.50     Years: 1.00     Pack years: 0.50     Types: Cigarettes     Start date: 1997     Quit date: 1998     Years since quittin.1     Smokeless tobacco: Former User   Substance Use Topics     Alcohol use: Yes     Comment: every couple days I have one cocktail     Drug use: No     PHQ 2021   PHQ-9 Total Score 9 1   Q9: Thoughts of better off dead/self-harm past 2 weeks Not at all Not at all     JEAN-7 " SCORE 8/2/2021   Total Score 3     Last PHQ-9 8/2/2021   1.  Little interest or pleasure in doing things 0   2.  Feeling down, depressed, or hopeless 0   3.  Trouble falling or staying asleep, or sleeping too much 0   4.  Feeling tired or having little energy 1   5.  Poor appetite or overeating 0   6.  Feeling bad about yourself 0   7.  Trouble concentrating 0   8.  Moving slowly or restless 0   Q9: Thoughts of better off dead/self-harm past 2 weeks 0   PHQ-9 Total Score 1   Difficulty at work, home, or with people Not difficult at all     JEAN-7  8/2/2021   1. Feeling nervous, anxious, or on edge 1   2. Not being able to stop or control worrying 1   3. Worrying too much about different things 1   4. Trouble relaxing 0   5. Being so restless that it is hard to sit still 0   6. Becoming easily annoyed or irritable 0   7. Feeling afraid, as if something awful might happen 0   JEAN-7 Total Score 3   If you checked any problems, how difficult have they made it for you to do your work, take care of things at home, or get along with other people? Not difficult at all             Patient greatly improved on the prozac. No bad side effects and she is worrying a lot less. It is now no longer interfering with her day.   Sleep is fine and she feels good. No ami symptoms      Review of Systems   Constitutional, HEENT, cardiovascular, pulmonary, gi and gu systems are negative, except as otherwise noted.      Objective           Vitals:  No vitals were obtained today due to virtual visit.    Physical Exam   GENERAL: Healthy, alert and no distress  EYES: Eyes grossly normal to inspection.  No discharge or erythema, or obvious scleral/conjunctival abnormalities.  RESP: No audible wheeze, cough, or visible cyanosis.  No visible retractions or increased work of breathing.    SKIN: Visible skin clear. No significant rash, abnormal pigmentation or lesions.  NEURO: Cranial nerves grossly intact.  Mentation and speech appropriate for  age.  PSYCH: Mentation appears normal, affect normal/bright, judgement and insight intact, normal speech and appearance well-groomed.    Office Visit on 02/09/2021   Component Date Value Ref Range Status     TSH 02/09/2021 2.08  0.40 - 4.00 mU/L Final               Video-Visit Details    Type of service:  Video Visit    Video End Time:11:54 AM    Originating Location (pt. Location): Home    Distant Location (provider location):  [facility]     Platform used for Video Visit: [employer]

## 2021-08-03 ASSESSMENT — ANXIETY QUESTIONNAIRES: GAD7 TOTAL SCORE: 3

## 2021-09-25 ENCOUNTER — HEALTH MAINTENANCE LETTER (OUTPATIENT)
Age: 41
End: 2021-09-25

## 2022-02-16 ENCOUNTER — TRANSFERRED RECORDS (OUTPATIENT)
Dept: HEALTH INFORMATION MANAGEMENT | Facility: CLINIC | Age: 42
End: 2022-02-16
Payer: COMMERCIAL

## 2022-03-09 ENCOUNTER — HOSPITAL ENCOUNTER (OUTPATIENT)
Dept: MAMMOGRAPHY | Facility: CLINIC | Age: 42
Discharge: HOME | End: 2022-03-09
Attending: FAMILY MEDICINE
Payer: COMMERCIAL

## 2022-03-09 DIAGNOSIS — Z12.31 VISIT FOR SCREENING MAMMOGRAM: ICD-10-CM

## 2022-03-09 PROCEDURE — 77067 SCR MAMMO BI INCL CAD: CPT

## 2022-03-12 ENCOUNTER — HEALTH MAINTENANCE LETTER (OUTPATIENT)
Age: 42
End: 2022-03-12

## 2022-04-13 ENCOUNTER — MYC MEDICAL ADVICE (OUTPATIENT)
Dept: FAMILY MEDICINE | Facility: CLINIC | Age: 42
End: 2022-04-13
Payer: COMMERCIAL

## 2022-04-13 ASSESSMENT — ENCOUNTER SYMPTOMS
PARESTHESIAS: 0
FREQUENCY: 0
HEARTBURN: 1
CHILLS: 0
NERVOUS/ANXIOUS: 0
CONSTIPATION: 0
SORE THROAT: 0
HEMATURIA: 0
DIARRHEA: 0
COUGH: 0
EYE PAIN: 0
MYALGIAS: 0
BREAST MASS: 0
ABDOMINAL PAIN: 0
HEADACHES: 1
ARTHRALGIAS: 0
HEMATOCHEZIA: 0
PALPITATIONS: 0
WEAKNESS: 0
JOINT SWELLING: 0
DIZZINESS: 1
DYSURIA: 0
FEVER: 0
SHORTNESS OF BREATH: 0
NAUSEA: 0

## 2022-04-20 ENCOUNTER — OFFICE VISIT (OUTPATIENT)
Dept: FAMILY MEDICINE | Facility: CLINIC | Age: 42
End: 2022-04-20
Payer: COMMERCIAL

## 2022-04-20 VITALS
DIASTOLIC BLOOD PRESSURE: 88 MMHG | WEIGHT: 148 LBS | OXYGEN SATURATION: 99 % | BODY MASS INDEX: 26.22 KG/M2 | TEMPERATURE: 98.3 F | SYSTOLIC BLOOD PRESSURE: 138 MMHG | HEIGHT: 63 IN | HEART RATE: 88 BPM

## 2022-04-20 DIAGNOSIS — R35.0 URINARY FREQUENCY: Primary | ICD-10-CM

## 2022-04-20 DIAGNOSIS — G43.109 MIGRAINE WITH AURA AND WITHOUT STATUS MIGRAINOSUS, NOT INTRACTABLE: ICD-10-CM

## 2022-04-20 LAB
RBC #/AREA URNS AUTO: ABNORMAL /HPF
SQUAMOUS #/AREA URNS AUTO: ABNORMAL /LPF
WBC #/AREA URNS AUTO: ABNORMAL /HPF

## 2022-04-20 PROCEDURE — 81015 MICROSCOPIC EXAM OF URINE: CPT | Performed by: FAMILY MEDICINE

## 2022-04-20 PROCEDURE — 99396 PREV VISIT EST AGE 40-64: CPT | Performed by: FAMILY MEDICINE

## 2022-04-20 RX ORDER — FAMOTIDINE 10 MG
10 TABLET ORAL DAILY
COMMUNITY

## 2022-04-20 ASSESSMENT — ENCOUNTER SYMPTOMS
HEMATOCHEZIA: 0
BREAST MASS: 0
DIZZINESS: 1
EYE PAIN: 0
SORE THROAT: 0
ARTHRALGIAS: 0
CONSTIPATION: 0
SHORTNESS OF BREATH: 0
HEADACHES: 1
FEVER: 0
PALPITATIONS: 0
HEMATURIA: 0
ABDOMINAL PAIN: 0
WEAKNESS: 0
MYALGIAS: 0
NAUSEA: 0
HEARTBURN: 1
NERVOUS/ANXIOUS: 0
CHILLS: 0
FREQUENCY: 0
JOINT SWELLING: 0
COUGH: 0
DIARRHEA: 0
DYSURIA: 0
PARESTHESIAS: 0
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
## 2022-04-21 ENCOUNTER — HOSPITAL ENCOUNTER (EMERGENCY)
Facility: CLINIC | Age: 42
Discharge: HOME | End: 2022-04-21
Attending: EMERGENCY MEDICINE | Admitting: EMERGENCY MEDICINE
Payer: COMMERCIAL

## 2022-04-21 ENCOUNTER — APPOINTMENT (OUTPATIENT)
Dept: CT IMAGING | Facility: CLINIC | Age: 42
End: 2022-04-21
Attending: EMERGENCY MEDICINE
Payer: COMMERCIAL

## 2022-04-21 VITALS
RESPIRATION RATE: 16 BRPM | BODY MASS INDEX: 25.34 KG/M2 | DIASTOLIC BLOOD PRESSURE: 87 MMHG | SYSTOLIC BLOOD PRESSURE: 131 MMHG | HEART RATE: 78 BPM | TEMPERATURE: 98.1 F | HEIGHT: 63 IN | OXYGEN SATURATION: 100 % | WEIGHT: 143 LBS

## 2022-04-21 DIAGNOSIS — R10.9 ACUTE RIGHT FLANK PAIN: ICD-10-CM

## 2022-04-21 DIAGNOSIS — N20.1 URETERAL STONE: ICD-10-CM

## 2022-04-21 LAB
ALBUMIN UR-MCNC: NEGATIVE MG/DL
APPEARANCE UR: CLEAR
BACTERIA #/AREA URNS HPF: ABNORMAL /HPF
BILIRUB UR QL STRIP: NEGATIVE
COLOR UR AUTO: YELLOW
GLUCOSE UR STRIP-MCNC: NEGATIVE MG/DL
HCG UR QL: NEGATIVE
HGB UR QL STRIP: ABNORMAL
HYALINE CASTS: 1 /LPF
KETONES UR STRIP-MCNC: 80 MG/DL
LEUKOCYTE ESTERASE UR QL STRIP: NEGATIVE
NITRATE UR QL: NEGATIVE
PH UR STRIP: 5 [PH] (ref 5–7)
RBC URINE: 2 /HPF
SP GR UR STRIP: 1.01 (ref 1–1.03)
SQUAMOUS EPITHELIAL: <1 /HPF
UROBILINOGEN UR STRIP-MCNC: NORMAL MG/DL
WBC URINE: 4 /HPF

## 2022-04-21 PROCEDURE — 99284 EMERGENCY DEPT VISIT MOD MDM: CPT | Mod: 25 | Performed by: EMERGENCY MEDICINE

## 2022-04-21 PROCEDURE — 250N000013 HC RX MED GY IP 250 OP 250 PS 637: Performed by: EMERGENCY MEDICINE

## 2022-04-21 PROCEDURE — 81025 URINE PREGNANCY TEST: CPT | Performed by: EMERGENCY MEDICINE

## 2022-04-21 PROCEDURE — 74176 CT ABD & PELVIS W/O CONTRAST: CPT

## 2022-04-21 PROCEDURE — 99284 EMERGENCY DEPT VISIT MOD MDM: CPT | Performed by: EMERGENCY MEDICINE

## 2022-04-21 PROCEDURE — 81001 URINALYSIS AUTO W/SCOPE: CPT | Performed by: EMERGENCY MEDICINE

## 2022-04-21 RX ORDER — ACETAMINOPHEN 500 MG
1000 TABLET ORAL
Status: COMPLETED | OUTPATIENT
Start: 2022-04-21 | End: 2022-04-21

## 2022-04-21 RX ADMIN — ACETAMINOPHEN 1000 MG: 500 TABLET ORAL at 18:07

## 2022-04-21 ASSESSMENT — ENCOUNTER SYMPTOMS
FLANK PAIN: 1
EYES NEGATIVE: 1
ABDOMINAL PAIN: 1
HEMATOLOGIC/LYMPHATIC NEGATIVE: 1
CONSTITUTIONAL NEGATIVE: 1
ALLERGIC/IMMUNOLOGIC NEGATIVE: 1
ENDOCRINE NEGATIVE: 1
RESPIRATORY NEGATIVE: 1
NEUROLOGICAL NEGATIVE: 1
CARDIOVASCULAR NEGATIVE: 1
PSYCHIATRIC NEGATIVE: 1

## 2022-04-21 NOTE — ED TRIAGE NOTES
Flank pain that started on Tursday. Had some vomiting. Saw MD and they r/o uti now having more pain
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
## 2022-04-22 NOTE — DISCHARGE INSTRUCTIONS
1) Your evaluation confirmed that your flank pain over the last 2 days and suprapubic pressure is likely due to a distal ureteral stone.  CT imaging revealed a 2 x 1 mm stone in the distal right ureter at the UVJ.  We discussed the likelihood of passing the stone spontaneously.    2)  have opted to go home with plan to manage your pain with Tylenol 650 mg every 4 hours, Motrin 400 to 6 mg with food every 6 hours or Advil 1 tablet every 12 hours.    3) We jacquelin to the likelihood of recurrence of stones given your family history your recent keto diet in the last 2 months, and hydration.  We also reviewed your family history of kidney stones.  Although he appears stable for discharge home at this time if he develops fever, chills, difficulty urinating or any new concerns he should return to be reevaluated

## 2022-05-02 ENCOUNTER — TRANSFERRED RECORDS (OUTPATIENT)
Dept: HEALTH INFORMATION MANAGEMENT | Facility: CLINIC | Age: 42
End: 2022-05-02
Payer: COMMERCIAL

## 2022-09-22 NOTE — DISCHARGE INSTRUCTIONS
1) The cause of your episode as described while driving to work this morning is not clear. Your evaluation and time in the emergency department did not suggest a stroke or life threatening process. Although the cause is unclear and uncertain, your time and evaluation emergency department today does not raise any concerns for stroke, seizure.  We discussed your abnormal finding on the MRI it is unclear if this is related to your symptoms as reported in your presentation.    2) if you have recurrence of similar symptoms you should return to the emergency department for reevaluation.  It may be beneficial to have a comparison MRI for comparison within the next 6 months to 1 year.  Discussion with your primary care provider  is suggested.   Continue to wear a mask for an additional 5 days after discharge, you are okay to go back to work already. Continue steroid therapy, antibiotics, complete course as indicated.     nirmatrelvir/ritonavir (PAXLOVID) 3 tablet, Oral, EVERY 12 HOURS, 10 doses, First dose on Wed 9/21/22 at 1800, Last dose on Mon 9/26/22 at 0600

## 2023-01-07 ENCOUNTER — HEALTH MAINTENANCE LETTER (OUTPATIENT)
Age: 43
End: 2023-01-07

## 2023-02-23 ASSESSMENT — ENCOUNTER SYMPTOMS
PARESTHESIAS: 0
PALPITATIONS: 0
EYE PAIN: 0
HEARTBURN: 1
SORE THROAT: 0
CONSTIPATION: 0
DIZZINESS: 0
BREAST MASS: 0
DIARRHEA: 0
HEADACHES: 1
NAUSEA: 0
FREQUENCY: 0
COUGH: 0
CHILLS: 0
JOINT SWELLING: 0
HEMATURIA: 0
DYSURIA: 0
MYALGIAS: 0
ABDOMINAL PAIN: 0
NERVOUS/ANXIOUS: 0
HEMATOCHEZIA: 0
ARTHRALGIAS: 0
WEAKNESS: 0
SHORTNESS OF BREATH: 0
FEVER: 0

## 2023-03-01 ENCOUNTER — OFFICE VISIT (OUTPATIENT)
Dept: FAMILY MEDICINE | Facility: CLINIC | Age: 43
End: 2023-03-01
Payer: COMMERCIAL

## 2023-03-01 VITALS
HEART RATE: 85 BPM | BODY MASS INDEX: 24.62 KG/M2 | TEMPERATURE: 97.1 F | OXYGEN SATURATION: 100 % | WEIGHT: 139 LBS | SYSTOLIC BLOOD PRESSURE: 114 MMHG | DIASTOLIC BLOOD PRESSURE: 76 MMHG

## 2023-03-01 DIAGNOSIS — F43.23 ADJUSTMENT DISORDER WITH MIXED ANXIETY AND DEPRESSED MOOD: ICD-10-CM

## 2023-03-01 DIAGNOSIS — G43.109 MIGRAINE WITH AURA AND WITHOUT STATUS MIGRAINOSUS, NOT INTRACTABLE: ICD-10-CM

## 2023-03-01 DIAGNOSIS — Z00.00 ROUTINE GENERAL MEDICAL EXAMINATION AT A HEALTH CARE FACILITY: Primary | ICD-10-CM

## 2023-03-01 LAB
ANION GAP SERPL CALCULATED.3IONS-SCNC: 12 MMOL/L (ref 7–15)
BUN SERPL-MCNC: 15.2 MG/DL (ref 6–20)
CALCIUM SERPL-MCNC: 9.8 MG/DL (ref 8.6–10)
CHLORIDE SERPL-SCNC: 101 MMOL/L (ref 98–107)
CREAT SERPL-MCNC: 0.83 MG/DL (ref 0.51–0.95)
DEPRECATED HCO3 PLAS-SCNC: 27 MMOL/L (ref 22–29)
GFR SERPL CREATININE-BSD FRML MDRD: 89 ML/MIN/1.73M2
GLUCOSE SERPL-MCNC: 91 MG/DL (ref 70–99)
POTASSIUM SERPL-SCNC: 4 MMOL/L (ref 3.4–5.3)
SODIUM SERPL-SCNC: 140 MMOL/L (ref 136–145)

## 2023-03-01 PROCEDURE — 99396 PREV VISIT EST AGE 40-64: CPT | Performed by: FAMILY MEDICINE

## 2023-03-01 PROCEDURE — 99213 OFFICE O/P EST LOW 20 MIN: CPT | Mod: 25 | Performed by: FAMILY MEDICINE

## 2023-03-01 PROCEDURE — 80048 BASIC METABOLIC PNL TOTAL CA: CPT | Performed by: FAMILY MEDICINE

## 2023-03-01 PROCEDURE — 36415 COLL VENOUS BLD VENIPUNCTURE: CPT | Performed by: FAMILY MEDICINE

## 2023-03-01 RX ORDER — RIZATRIPTAN BENZOATE 10 MG/1
10 TABLET, ORALLY DISINTEGRATING ORAL
Qty: 9 TABLET | Refills: 3 | Status: SHIPPED | OUTPATIENT
Start: 2023-03-01 | End: 2024-03-14

## 2023-03-01 RX ORDER — ONDANSETRON 8 MG/1
8 TABLET, ORALLY DISINTEGRATING ORAL EVERY 8 HOURS PRN
Qty: 20 TABLET | Refills: 3 | Status: SHIPPED | OUTPATIENT
Start: 2023-03-01 | End: 2024-06-07

## 2023-03-01 RX ORDER — FLUOXETINE 10 MG/1
10 CAPSULE ORAL DAILY
Qty: 90 CAPSULE | Refills: 1 | Status: SHIPPED | OUTPATIENT
Start: 2023-03-01 | End: 2024-01-11

## 2023-03-01 ASSESSMENT — PATIENT HEALTH QUESTIONNAIRE - PHQ9
5. POOR APPETITE OR OVEREATING: NOT AT ALL
SUM OF ALL RESPONSES TO PHQ QUESTIONS 1-9: 2

## 2023-03-01 ASSESSMENT — ENCOUNTER SYMPTOMS
EYE PAIN: 0
JOINT SWELLING: 0
CHILLS: 0
BREAST MASS: 0
DIZZINESS: 0
HEARTBURN: 1
DIARRHEA: 0
FREQUENCY: 0
ABDOMINAL PAIN: 0
DYSURIA: 0
NAUSEA: 0
PARESTHESIAS: 0
WEAKNESS: 0
MYALGIAS: 0
SORE THROAT: 0
HEMATOCHEZIA: 0
CONSTIPATION: 0
FEVER: 0
NERVOUS/ANXIOUS: 0
COUGH: 0
ARTHRALGIAS: 0
HEMATURIA: 0
HEADACHES: 1
PALPITATIONS: 0
SHORTNESS OF BREATH: 0

## 2023-03-01 ASSESSMENT — ANXIETY QUESTIONNAIRES
2. NOT BEING ABLE TO STOP OR CONTROL WORRYING: SEVERAL DAYS
GAD7 TOTAL SCORE: 5
IF YOU CHECKED OFF ANY PROBLEMS ON THIS QUESTIONNAIRE, HOW DIFFICULT HAVE THESE PROBLEMS MADE IT FOR YOU TO DO YOUR WORK, TAKE CARE OF THINGS AT HOME, OR GET ALONG WITH OTHER PEOPLE: NOT DIFFICULT AT ALL
5. BEING SO RESTLESS THAT IT IS HARD TO SIT STILL: NOT AT ALL
3. WORRYING TOO MUCH ABOUT DIFFERENT THINGS: SEVERAL DAYS
GAD7 TOTAL SCORE: 5
6. BECOMING EASILY ANNOYED OR IRRITABLE: SEVERAL DAYS
1. FEELING NERVOUS, ANXIOUS, OR ON EDGE: SEVERAL DAYS
7. FEELING AFRAID AS IF SOMETHING AWFUL MIGHT HAPPEN: SEVERAL DAYS
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
## 2023-03-13 ENCOUNTER — HOSPITAL ENCOUNTER (OUTPATIENT)
Dept: MAMMOGRAPHY | Facility: CLINIC | Age: 43
Discharge: HOME | End: 2023-03-13
Attending: FAMILY MEDICINE | Admitting: FAMILY MEDICINE
Payer: COMMERCIAL

## 2023-03-13 ENCOUNTER — ANCILLARY ORDERS (OUTPATIENT)
Dept: MAMMOGRAPHY | Facility: CLINIC | Age: 43
End: 2023-03-13

## 2023-03-13 DIAGNOSIS — Z12.31 VISIT FOR SCREENING MAMMOGRAM: ICD-10-CM

## 2023-03-13 PROCEDURE — 77067 SCR MAMMO BI INCL CAD: CPT

## 2023-05-02 ENCOUNTER — TRANSFERRED RECORDS (OUTPATIENT)
Dept: HEALTH INFORMATION MANAGEMENT | Facility: CLINIC | Age: 43
End: 2023-05-02
Payer: COMMERCIAL

## 2024-01-11 ENCOUNTER — MYC REFILL (OUTPATIENT)
Dept: FAMILY MEDICINE | Facility: CLINIC | Age: 44
End: 2024-01-11
Payer: COMMERCIAL

## 2024-01-11 DIAGNOSIS — F43.23 ADJUSTMENT DISORDER WITH MIXED ANXIETY AND DEPRESSED MOOD: ICD-10-CM

## 2024-01-11 RX ORDER — FLUOXETINE 10 MG/1
10 CAPSULE ORAL DAILY
Qty: 90 CAPSULE | Refills: 0 | Status: SHIPPED | OUTPATIENT
Start: 2024-01-11 | End: 2024-03-14

## 2024-01-31 ENCOUNTER — PATIENT OUTREACH (OUTPATIENT)
Dept: CARE COORDINATION | Facility: CLINIC | Age: 44
End: 2024-01-31
Payer: COMMERCIAL

## 2024-02-14 ENCOUNTER — PATIENT OUTREACH (OUTPATIENT)
Dept: CARE COORDINATION | Facility: CLINIC | Age: 44
End: 2024-02-14
Payer: COMMERCIAL

## 2024-02-27 ENCOUNTER — IMMUNIZATION (OUTPATIENT)
Dept: FAMILY MEDICINE | Facility: CLINIC | Age: 44
End: 2024-02-27
Payer: COMMERCIAL

## 2024-02-27 ENCOUNTER — MYC MEDICAL ADVICE (OUTPATIENT)
Dept: INTERNAL MEDICINE | Facility: CLINIC | Age: 44
End: 2024-02-27

## 2024-02-27 PROCEDURE — 90471 IMMUNIZATION ADMIN: CPT

## 2024-02-27 PROCEDURE — 90686 IIV4 VACC NO PRSV 0.5 ML IM: CPT

## 2024-03-13 SDOH — HEALTH STABILITY: PHYSICAL HEALTH: ON AVERAGE, HOW MANY MINUTES DO YOU ENGAGE IN EXERCISE AT THIS LEVEL?: 50 MIN

## 2024-03-13 SDOH — HEALTH STABILITY: PHYSICAL HEALTH: ON AVERAGE, HOW MANY DAYS PER WEEK DO YOU ENGAGE IN MODERATE TO STRENUOUS EXERCISE (LIKE A BRISK WALK)?: 7 DAYS

## 2024-03-13 ASSESSMENT — SOCIAL DETERMINANTS OF HEALTH (SDOH): HOW OFTEN DO YOU GET TOGETHER WITH FRIENDS OR RELATIVES?: ONCE A WEEK

## 2024-03-14 ENCOUNTER — OFFICE VISIT (OUTPATIENT)
Dept: FAMILY MEDICINE | Facility: CLINIC | Age: 44
End: 2024-03-14
Payer: COMMERCIAL

## 2024-03-14 ENCOUNTER — PATIENT OUTREACH (OUTPATIENT)
Dept: ONCOLOGY | Facility: CLINIC | Age: 44
End: 2024-03-14

## 2024-03-14 ENCOUNTER — HOSPITAL ENCOUNTER (OUTPATIENT)
Dept: MAMMOGRAPHY | Facility: CLINIC | Age: 44
Discharge: HOME | End: 2024-03-14
Attending: FAMILY MEDICINE | Admitting: FAMILY MEDICINE
Payer: COMMERCIAL

## 2024-03-14 VITALS
HEART RATE: 79 BPM | DIASTOLIC BLOOD PRESSURE: 74 MMHG | HEIGHT: 63 IN | TEMPERATURE: 97.7 F | WEIGHT: 151 LBS | OXYGEN SATURATION: 98 % | BODY MASS INDEX: 26.75 KG/M2 | SYSTOLIC BLOOD PRESSURE: 120 MMHG | RESPIRATION RATE: 18 BRPM

## 2024-03-14 DIAGNOSIS — Z12.31 VISIT FOR SCREENING MAMMOGRAM: ICD-10-CM

## 2024-03-14 DIAGNOSIS — F43.23 ADJUSTMENT DISORDER WITH MIXED ANXIETY AND DEPRESSED MOOD: ICD-10-CM

## 2024-03-14 DIAGNOSIS — Z13.220 SCREENING FOR CHOLESTEROL LEVEL: ICD-10-CM

## 2024-03-14 DIAGNOSIS — Z00.00 ROUTINE GENERAL MEDICAL EXAMINATION AT A HEALTH CARE FACILITY: Primary | ICD-10-CM

## 2024-03-14 DIAGNOSIS — Z12.11 SCREEN FOR COLON CANCER: ICD-10-CM

## 2024-03-14 DIAGNOSIS — Z80.3 FAMILY HISTORY OF MALIGNANT NEOPLASM OF BREAST: ICD-10-CM

## 2024-03-14 DIAGNOSIS — G43.109 MIGRAINE WITH AURA AND WITHOUT STATUS MIGRAINOSUS, NOT INTRACTABLE: ICD-10-CM

## 2024-03-14 LAB
CHOLEST SERPL-MCNC: 216 MG/DL
CREAT SERPL-MCNC: 0.72 MG/DL (ref 0.51–0.95)
EGFRCR SERPLBLD CKD-EPI 2021: >90 ML/MIN/1.73M2
FASTING STATUS PATIENT QL REPORTED: YES
HDLC SERPL-MCNC: 67 MG/DL
LDLC SERPL CALC-MCNC: 137 MG/DL
NONHDLC SERPL-MCNC: 149 MG/DL
TRIGL SERPL-MCNC: 59 MG/DL

## 2024-03-14 PROCEDURE — 99396 PREV VISIT EST AGE 40-64: CPT | Mod: 25 | Performed by: FAMILY MEDICINE

## 2024-03-14 PROCEDURE — 36415 COLL VENOUS BLD VENIPUNCTURE: CPT | Performed by: FAMILY MEDICINE

## 2024-03-14 PROCEDURE — 99213 OFFICE O/P EST LOW 20 MIN: CPT | Mod: 25 | Performed by: FAMILY MEDICINE

## 2024-03-14 PROCEDURE — 80061 LIPID PANEL: CPT | Performed by: FAMILY MEDICINE

## 2024-03-14 PROCEDURE — 82565 ASSAY OF CREATININE: CPT | Performed by: FAMILY MEDICINE

## 2024-03-14 PROCEDURE — 77063 BREAST TOMOSYNTHESIS BI: CPT

## 2024-03-14 RX ORDER — FLUOXETINE 10 MG/1
10 CAPSULE ORAL DAILY
Qty: 90 CAPSULE | Refills: 3 | Status: SHIPPED | OUTPATIENT
Start: 2024-03-14

## 2024-03-14 RX ORDER — RIZATRIPTAN BENZOATE 10 MG/1
10 TABLET, ORALLY DISINTEGRATING ORAL
Qty: 9 TABLET | Refills: 3 | Status: SHIPPED | OUTPATIENT
Start: 2024-03-14 | End: 2024-06-07

## 2024-03-14 ASSESSMENT — PAIN SCALES - GENERAL: PAINLEVEL: NO PAIN (0)

## 2024-03-14 NOTE — PROGRESS NOTES
Writer received referral to Cancer Risk Management/Genetic Counseling.    Referred for:     Family history of malignant neoplasm of breast        Referral reviewed for appropriate plan, and sent to New Patient Scheduling ([phone]) for completion.    Samantha Griffin, RN, BSN  Oncology New Patient Nurse Navigator   [facility]  [phone]

## 2024-03-14 NOTE — PATIENT INSTRUCTIONS
Preventive Care Advice   This is general advice given by our system to help you stay healthy. However, your care team may have specific advice just for you. Please talk to your care team about your preventive care needs.  Nutrition  Eat 5 or more servings of fruits and vegetables each day.  Try wheat bread, brown rice and whole grain pasta (instead of white bread, rice, and pasta).  Get enough calcium and vitamin D. Check the label on foods and aim for 100% of the RDA (recommended daily allowance).  Lifestyle  Exercise at least 150 minutes each week   (30 minutes a day, 5 days a week).  Do muscle strengthening activities 2 days a week. These help control your weight and prevent disease.  No smoking.  Wear sunscreen to prevent skin cancer.  Have a dental exam and cleaning every 6 months.  Yearly exams  See your health care team every year to talk about:  Any changes in your health.  Any medicines your care team has prescribed.  Preventive care, family planning, and ways to prevent chronic diseases.  Shots (vaccines)   HPV shots (up to age 26), if you've never had them before.  Hepatitis B shots (up to age 59), if you've never had them before.  COVID-19 shot: Get this shot when it's due.  Flu shot: Get a flu shot every year.  Tetanus shot: Get a tetanus shot every 10 years.  Pneumococcal, hepatitis A, and RSV shots: Ask your care team if you need these based on your risk.  Shingles shot (for age 50 and up).  General health tests  Diabetes screening:  Starting at age 35, Get screened for diabetes at least every 3 years.  If you are younger than age 35, ask your care team if you should be screened for diabetes.  Cholesterol test: At age 39, start having a cholesterol test every 5 years, or more often if advised.  Bone density scan (DEXA): At age 50, ask your care team if you should have this scan for osteoporosis (brittle bones).  Hepatitis C: Get tested at least once in your life.  STIs (sexually transmitted  infections)  Before age 24: Ask your care team if you should be screened for STIs.  After age 24: Get screened for STIs if you're at risk. You are at risk for STIs (including HIV) if:  You are sexually active with more than one person.  You don't use condoms every time.  You or a partner was diagnosed with a sexually transmitted infection.  If you are at risk for HIV, ask about PrEP medicine to prevent HIV.  Get tested for HIV at least once in your life, whether you are at risk for HIV or not.  Cancer screening tests  Cervical cancer screening: If you have a cervix, begin getting regular cervical cancer screening tests at age 21. Most people who have regular screenings with normal results can stop after age 65. Talk about this with your provider.  Breast cancer scan (mammogram): If you've ever had breasts, begin having regular mammograms starting at age 40. This is a scan to check for breast cancer.  Colon cancer screening: It is important to start screening for colon cancer at age 45.  Have a colonoscopy test every 10 years (or more often if you're at risk) Or, ask your provider about stool tests like a FIT test every year or Cologuard test every 3 years.  To learn more about your testing options, visit: https://www.Anews/200583.pdf.  For help making a decision, visit: https://bit.ly/qj55638.  Prostate cancer screening test: If you have a prostate and are age 55 to 69, ask your provider if you would benefit from a yearly prostate cancer screening test.  Lung cancer screening: If you are a current or former smoker age 50 to 80, ask your care team if ongoing lung cancer screenings are right for you.  For informational purposes only. Not to replace the advice of your health care provider. Copyright   2023 [employer]. All rights reserved. Clinically reviewed by the [facility] Transitions Program. [employer] 626656 - REV 01/24.    Learning About Stress  What is stress?     Stress is your  body's response to a hard situation. Your body can have a physical, emotional, or mental response. Stress is a fact of life for most people, and it affects everyone differently. What causes stress for you may not be stressful for someone else.  A lot of things can cause stress. You may feel stress when you go on a job interview, take a test, or run a race. This kind of short-term stress is normal and even useful. It can help you if you need to work hard or react quickly. For example, stress can help you finish an important job on time.  Long-term stress is caused by ongoing stressful situations or events. Examples of long-term stress include long-term health problems, ongoing problems at work, or conflicts in your family. Long-term stress can harm your health.  How does stress affect your health?  When you are stressed, your body responds as though you are in danger. It makes hormones that speed up your heart, make you breathe faster, and give you a burst of energy. This is called the fight-or-flight stress response. If the stress is over quickly, your body goes back to normal and no harm is done.  But if stress happens too often or lasts too long, it can have bad effects. Long-term stress can make you more likely to get sick, and it can make symptoms of some diseases worse. If you tense up when you are stressed, you may develop neck, shoulder, or low back pain. Stress is linked to high blood pressure and heart disease.  Stress also harms your emotional health. It can make you valentino, tense, or depressed. Your relationships may suffer, and you may not do well at work or school.  What can you do to manage stress?  You can try these things to help manage stress:   Do something active. Exercise or activity can help reduce stress. Walking is a great way to get started. Even everyday activities such as housecleaning or yard work can help.  Try yoga or phylicia chi. These techniques combine exercise and meditation. You may need  some training at first to learn them.  Do something you enjoy. For example, listen to music or go to a movie. Practice your hobby or do volunteer work.  Meditate. This can help you relax, because you are not worrying about what happened before or what may happen in the future.  Do guided imagery. Imagine yourself in any setting that helps you feel calm. You can use online videos, books, or a teacher to guide you.  Do breathing exercises. For example:  From a standing position, bend forward from the waist with your knees slightly bent. Let your arms dangle close to the floor.  Breathe in slowly and deeply as you return to a standing position. Roll up slowly and lift your head last.  Hold your breath for just a few seconds in the standing position.  Breathe out slowly and bend forward from the waist.  Let your feelings out. Talk, laugh, cry, and express anger when you need to. Talking with supportive friends or family, a counselor, or a [employer] leader about your feelings is a healthy way to relieve stress. Avoid discussing your feelings with people who make you feel worse.  Write. It may help to write about things that are bothering you. This helps you find out how much stress you feel and what is causing it. When you know this, you can find better ways to cope.  What can you do to prevent stress?  You might try some of these things to help prevent stress:  Manage your time. This helps you find time to do the things you want and need to do.  Get enough sleep. Your body recovers from the stresses of the day while you are sleeping.  Get support. Your family, friends, and community can make a difference in how you experience stress.  Limit your news feed. Avoid or limit time on social media or news that may make you feel stressed.  Do something active. Exercise or activity can help reduce stress. Walking is a great way to get started.  Where can you learn more?  Go to https://www.healthwise.net/patiented  Enter N032 in the  "search box to learn more about \"Learning About Stress.\"  Current as of: October 24, 2023               Content Version: 14.0    0240-0021 [employer].   Care instructions adapted under license by your healthcare professional. If you have questions about a medical condition or this instruction, always ask your healthcare professional. [employer] disclaims any warranty or liability for your use of this information.      "

## 2024-03-14 NOTE — PROGRESS NOTES
Preventive Care Visit  [facility] KENA Sanchez MD, Family Medicine  Mar 14, 2024      Assessment & Plan     Routine general medical examination at a health care facility      Screen for colon cancer  No family history of colon cancer or adenomatous polyps  - COLOGUARD([employer]); Future    Family history of malignant neoplasm of breast  Multiple people on her fathers side   - Adult Genetics & Metabolism  Referral; Future    Adjustment disorder with mixed anxiety and depressed mood  Very stable   - FLUoxetine (PROZAC) 10 MG capsule; Take 1 capsule (10 mg) by mouth daily    Migraine with aura and without status migrainosus, not intractable  Refilled good controll  - rizatriptan (MAXALT-MLT) 10 MG ODT; Take 1 tablet (10 mg) by mouth at onset of headache for migraine May repeat in 2 hours. Max 3 tablets/24 hours.  - amitriptyline (ELAVIL) 25 MG tablet; Take 1.5 tablets (37.5 mg) by mouth at bedtime 1.5 tablets per day    Screening for cholesterol level  Results for orders placed or performed during the hospital encounter of 03/14/24   MA Screening Bilateral w/ Dhaval     Status: Normal    Narrative    BILATERAL FULL FIELD DIGITAL SCREENING MAMMOGRAM WITH TOMOSYNTHESIS    Performed on: 3/14/24    Compared to: 03/13/2023, 03/09/2022, 02/10/2021, and 02/06/2020    Technique:  This study was evaluated with the assistance of Computer-Aided   Detection.  Breast Tomosynthesis was used in interpretation.    Findings: The breasts are heterogeneously dense, which may obscure small   masses.  There is no radiographic evidence of malignancy.     Impression    IMPRESSION: ACR BI-RADS Category 1: Negative    BREAST CANCER SCREENING RECOMMENDATION: Routine yearly mammography   beginning at age 40 or as discussed with your provider.    The results and recommendations of this examination will be communicated   to the patient.        Robert Zamora, DO     Results for orders placed or performed in  "visit on 03/14/24   Lipid panel reflex to direct LDL Non-fasting     Status: Abnormal   Result Value Ref Range    Cholesterol 216 (H) <200 mg/dL    Triglycerides 59 <150 mg/dL    Direct Measure HDL 67 >=50 mg/dL    LDL Cholesterol Calculated 137 (H) <=100 mg/dL    Non HDL Cholesterol 149 (H) <130 mg/dL    Patient Fasting > 8hrs? Yes     Narrative    Cholesterol  Desirable:  <200 mg/dL    Triglycerides  Normal:  Less than 150 mg/dL  Borderline High:  150-199 mg/dL  High:  200-499 mg/dL  Very High:  Greater than or equal to 500 mg/dL    Direct Measure HDL  Female:  Greater than or equal to 50 mg/dL   Male:  Greater than or equal to 40 mg/dL    LDL Cholesterol  Desirable:  <100mg/dL  Above Desirable:  100-129 mg/dL   Borderline High:  130-159 mg/dL   High:  160-189 mg/dL   Very High:  >= 190 mg/dL    Non HDL Cholesterol  Desirable:  130 mg/dL  Above Desirable:  130-159 mg/dL  Borderline High:  160-189 mg/dL  High:  190-219 mg/dL  Very High:  Greater than or equal to 220 mg/dL   Creatinine     Status: Normal   Result Value Ref Range    Creatinine 0.72 0.51 - 0.95 mg/dL    GFR Estimate >90 >60 mL/min/1.73m2       - Lipid panel reflex to direct LDL Non-fasting; Future    Patient has been advised of split billing requirements and indicates understanding: Yes          BMI  Estimated body mass index is 26.75 kg/m  as calculated from the following:    Height as of this encounter: 1.6 m (5' 3\").    Weight as of this encounter: 68.5 kg (151 lb).       Counseling  Appropriate preventive services were discussed with this patient, including applicable screening as appropriate for fall prevention, nutrition, physical activity, Tobacco-use cessation, weight loss and cognition.  Checklist reviewing preventive services available has been given to the patient.  Reviewed patient's diet, addressing concerns and/or questions.       FUTURE APPOINTMENTS:       - Follow-up for annual visit or as needed    Stef   Martha is a 43 year old, " presenting for the following:  Physical        3/14/2024     8:08 AM   Additional Questions   Roomed by Sruthi kaplan CMA   Accompanied by Self        Health Care Directive  Patient does not have a Health Care Directive or Living Will: Discussed advance care planning with patient; information given to patient to review.    HPI          3/13/2024   General Health   How would you rate your overall physical health? (!) FAIR   Feel stress (tense, anxious, or unable to sleep) Very much   (!) STRESS CONCERN      3/13/2024   Nutrition   Three or more servings of calcium each day? Yes   Diet: Regular (no restrictions)   How many servings of fruit and vegetables per day? (!) 0-1   How many sweetened beverages each day? 0-1         3/13/2024   Exercise   Days per week of moderate/strenous exercise 7 days   Average minutes spent exercising at this level 50 min         3/13/2024   Social Factors   Frequency of gathering with friends or relatives Once a week   Worry food won't last until get money to buy more No   Food not last or not have enough money for food? No   Do you have housing?  Yes   Are you worried about losing your housing? No   Lack of transportation? No   Unable to get utilities (heat,electricity)? No         3/13/2024   Dental   Dentist two times every year? Yes         3/13/2024   TB Screening   Were you born outside of [place]?  No         Today's PHQ-2 Score:       3/13/2024    12:23 PM   PHQ-2 ( 1999 [employer])   Q1: Little interest or pleasure in doing things 1   Q2: Feeling down, depressed or hopeless 1   PHQ-2 Score 2   Q1: Little interest or pleasure in doing things Several days   Q2: Feeling down, depressed or hopeless Several days   PHQ-2 Score 2           3/13/2024   Substance Use   Alcohol more than 3/day or more than 7/wk No   Do you use any other substances recreationally? No     Social History     Tobacco Use    Smoking status: Former     Packs/day: 0.50     Years: 1.00     Additional pack years: 0.00     Total  "pack years: 0.50     Types: Cigarettes     Start date: 1997     Quit date: 1998     Years since quittin.8    Smokeless tobacco: Former   Substance Use Topics    Alcohol use: Yes     Comment: one cocktail per week    Drug use: No           2023   LAST FHS-7 RESULTS   1st degree relative breast or ovarian cancer No   Any relative bilateral breast cancer Yes   Any male have breast cancer No   Any ONE woman have BOTH breast AND ovarian cancer Yes   Any woman with breast cancer before 50yrs Yes   2 or more relatives with breast AND/OR ovarian cancer Yes   2 or more relatives with breast AND/OR bowel cancer Yes        Referred to genetics        3/13/2024   STI Screening   New sexual partner(s) since last STI/HIV test? No     History of abnormal Pap smear: NO - age 30-65 PAP every 5 years with negative HPV co-testing recommended        2013    12:00 AM   PAP / HPV   PAP (Historical) NIL      ASCVD Risk   The 10-year ASCVD risk score (Robert ANGEL, et al., 2019) is: 0.6%    Values used to calculate the score:      Age: 43 years      Sex: Female      Is Non- : No      Diabetic: No      Tobacco smoker: No      Systolic Blood Pressure: 120 mmHg      Is BP treated: No      HDL Cholesterol: 67 mg/dL      Total Cholesterol: 216 mg/dL       Reviewed and updated as needed this visit by Provider                    Lab work is in process      Review of Systems  Constitutional, HEENT, cardiovascular, pulmonary, gi and gu systems are negative, except as otherwise noted.     Objective    Exam  /74   Pulse 79   Temp 97.7  F (36.5  C) (Tympanic)   Resp 18   Ht 1.6 m (5' 3\")   Wt 68.5 kg (151 lb)   LMP 2013   SpO2 98%   BMI 26.75 kg/m     Estimated body mass index is 26.75 kg/m  as calculated from the following:    Height as of this encounter: 1.6 m (5' 3\").    Weight as of this encounter: 68.5 kg (151 lb).    Physical Exam  GENERAL: alert and no distress  EYES: Eyes " grossly normal to inspection, PERRL and conjunctivae and sclerae normal  HENT: ear canals and TM's normal, nose and mouth without ulcers or lesions  NECK: no adenopathy, no asymmetry, masses, or scars  RESP: lungs clear to auscultation - no rales, rhonchi or wheezes  CV: regular rate and rhythm, normal S1 S2, no S3 or S4, no murmur, click or rub, no peripheral edema  ABDOMEN: soft, nontender, no hepatosplenomegaly, no masses and bowel sounds normal  MS: no gross musculoskeletal defects noted, no edema  SKIN: no suspicious lesions or rashes  NEURO: Normal strength and tone, mentation intact and speech normal  PSYCH: mentation appears normal, affect normal/bright        Signed Electronically by: Amairani Sanchez MD

## 2024-03-15 ENCOUNTER — ORDERS ONLY (AUTO-RELEASED) (OUTPATIENT)
Dept: FAMILY MEDICINE | Facility: CLINIC | Age: 44
End: 2024-03-15
Payer: COMMERCIAL

## 2024-03-15 DIAGNOSIS — Z12.11 SCREEN FOR COLON CANCER: ICD-10-CM

## 2024-03-28 LAB — NONINV COLON CA DNA+OCC BLD SCRN STL QL: NEGATIVE

## 2024-05-14 ENCOUNTER — TRANSFERRED RECORDS (OUTPATIENT)
Dept: HEALTH INFORMATION MANAGEMENT | Facility: CLINIC | Age: 44
End: 2024-05-14
Payer: COMMERCIAL

## 2024-05-20 ENCOUNTER — TRANSFERRED RECORDS (OUTPATIENT)
Dept: HEALTH INFORMATION MANAGEMENT | Facility: CLINIC | Age: 44
End: 2024-05-20
Payer: COMMERCIAL

## 2024-06-07 ENCOUNTER — MYC REFILL (OUTPATIENT)
Dept: FAMILY MEDICINE | Facility: CLINIC | Age: 44
End: 2024-06-07
Payer: COMMERCIAL

## 2024-06-07 DIAGNOSIS — G43.109 MIGRAINE WITH AURA AND WITHOUT STATUS MIGRAINOSUS, NOT INTRACTABLE: ICD-10-CM

## 2024-06-07 DIAGNOSIS — F43.23 ADJUSTMENT DISORDER WITH MIXED ANXIETY AND DEPRESSED MOOD: ICD-10-CM

## 2024-06-07 RX ORDER — FLUOXETINE 10 MG/1
10 CAPSULE ORAL DAILY
Qty: 90 CAPSULE | Refills: 3 | OUTPATIENT
Start: 2024-06-07

## 2024-06-07 RX ORDER — ONDANSETRON 8 MG/1
8 TABLET, ORALLY DISINTEGRATING ORAL EVERY 8 HOURS PRN
Qty: 20 TABLET | Refills: 3 | Status: SHIPPED | OUTPATIENT
Start: 2024-06-07

## 2024-06-07 RX ORDER — RIZATRIPTAN BENZOATE 10 MG/1
10 TABLET, ORALLY DISINTEGRATING ORAL
Qty: 9 TABLET | Refills: 3 | Status: SHIPPED | OUTPATIENT
Start: 2024-06-07

## 2024-06-19 ENCOUNTER — VIRTUAL VISIT (OUTPATIENT)
Dept: ONCOLOGY | Facility: CLINIC | Age: 44
End: 2024-06-19
Attending: FAMILY MEDICINE
Payer: COMMERCIAL

## 2024-06-19 DIAGNOSIS — Z80.3 FAMILY HISTORY OF MALIGNANT NEOPLASM OF BREAST: ICD-10-CM

## 2024-06-19 DIAGNOSIS — Z80.0 FAMILY HISTORY OF PANCREATIC CANCER: Primary | ICD-10-CM

## 2024-06-19 DIAGNOSIS — Z80.42 FAMILY HISTORY OF PROSTATE CANCER: ICD-10-CM

## 2024-06-19 PROCEDURE — 96040 HC GENETIC COUNSELING, EACH 30 MINUTES: CPT | Mod: GT,95 | Performed by: GENETIC COUNSELOR, MS

## 2024-06-19 NOTE — NURSING NOTE
Is the patient currently in the state of [place]? YES    Visit mode:VIDEO    If the visit is dropped, the patient can be reconnected by: VIDEO VISIT: Send to e-mail at: [email or web address]    Will anyone else be joining the visit? NO  (If patient encounters technical issues they should call [phone]    How would you like to obtain your AVS? MyChart    Are changes needed to the allergy or medication list? N/A    Are refills needed on medications prescribed by this physician? NO    Reason for visit: Consult    Joe CROCKETT

## 2024-06-19 NOTE — LETTER
6/19/2024      Martha Cruz  [address] [place] [id number]      Dear Colleague,    Thank you for referring your patient, Martha Cruz, to the [facility]. Please see a copy of my visit note below.    Virtual Visit Details    Type of service:  Video Visit   Joined the call at 6/19/2024, 8:51:31 am.  Left the call at 6/19/2024, 9:24:23 am.  Originating Location (pt. Location): Home  Distant Location (provider location):  Off-site  Platform used for Video Visit: [employer]    6/19/2024    Referring Provider: Amairani Sanchez MD    Presenting Information:   I met with Martha Cruz today for genetic counseling as part of the Cancer Risk Management Program (virtual visit) to discuss her family history of breast pancreatic cancer.  She is here today to review this history, cancer screening recommendations, and available genetic testing options.    Personal History:  Martha is a 43 year old female. She does not have any personal history of cancer. She has her left ovary in place.  Her first period was at age 11, first child when 22, and reports being perimenopause.  Mammogram from 3/14/2024 was normal and she completed cologuard screening.      Family History: (Please see scanned pedigree for detailed family history information)  Her father was recently diagnosed with pancreatic cancer at age 70.  He is not completing treatment/genetic testing.    One paternal aunt was diagnosed with breast cancer when 50 and passed away at age 57  Her paternal grandfather was found to have pancreatic cancer (on autopsy), early 70's.    Maternal grandmother had breast cancer in her 60's-70's  Her maternal ethnicity is [place]inean and Faroese. Her paternal ethnicity is [place]an, [place] Islander and Malay.      Discussion:  The features of sporadic, familial, and hereditary cancers were discussed, as it pertains to Martha's history of cancer. Martha's family history of breast and pancreatic cancer is suggestive of a  hereditary cancer syndrome.  A detailed handout regarding hereditary cancer and the information we discussed can be found in the after visit summary. Topics included: inheritance pattern, cancer risks, cancer screening recommendations, as well as risks, benefits and limitations of testing.  Martha meets current National Comprehensive Cancer Network (NCCN) criteria for genetic testing of hereditary breast and ovarian cancer syndrome (BRCA1 and BRCA2).    We discussed that there are additional genes that could cause increased risk for the cancers in Martha's family. As many of these genes present with overlapping features in a family and accurate cancer risk cannot always be established based upon the pedigree analysis alone, it would be reasonable for Martha to consider panel genetic testing to analyze multiple genes at once.  The information from genetic testing may determine additional cancer screening and risk management options, including earlier/more frequent imaging, and possible risk reducing surgeries.  For individuals with an active cancer diagnosis, the results from genetic testing may guide targeted therapies (i.e. immunotherapy for individuals with Yan syndrome, PARP inhibitors, etc.). These recommendations will be discussed in detail once genetic testing is completed.   An individual's personal and/or family history of cancer may be explained by more than one inherited cancer syndrome.  We reviewed available genetic testing options to address this history, including a tailored disease-focused panel, and expanded multi-cancer panels. Discussion included risks, benefits and limitations of testing.  Martha elected to proceed with genetic testing today.    The purpose of this genetic testing is to determine if Martha carries an inherited variant(s) associated with increased risk to develop cancer.  Genes included on this test may be associated with multiple types of cancer and are also associated with varying  levels of cancer risk.  Depending on these cancer risks, specific screening and medical management recommendations may be available.  Possible results from testing typically include  positive  (pathogenic/likely pathogenic variant),  negative  (normal), or  variant of unknown significance  (VUS).  Genetic test results have implications for Martha's family. Should a variant be identified, close relatives may also have a risk to carry the genetic variant. Some of these genes may have additional reproductive risks and options for further testing.    There are federal laws in place that prohibit health insurers and employers from discriminating based on genetic information (for example, the Genetic Information Nondiscrimination Act (RORY) of 2008; some exceptions to apply.   This protection does not cover life insurance, long term care, or disability insurances.   Consent was obtained over virtual visit, and Martha elected to proceed with BRCA1/2 germline genetic testing with automatic reflex to the guidelines-based breast, gyn and pancreatic cancer panels. .  Accepted sample types include saliva and blood.  Test turn around time: 4 weeks.      Plan:  1) Martha elected to proceed with germline genetic testing, including a guidelines based gynecologic, breast and pancreatic cancers panel.  2) A blood draw will be scheduled at an [facility] clinic    3) Martha will be contacted once testing is completed to schedule a return genetic counseling visit, in which the results from testing and medical management recommendations will be discussed.  Test turn around time: approximately 4 weeks.        Megan Hoffman MS, [facility] [place]  Licensed, Certified Genetic Counselor            Again, thank you for allowing me to participate in the care of your patient.        Sincerely,        Megan Hoffman GC

## 2024-06-19 NOTE — PROGRESS NOTES
Virtual Visit Details    Type of service:  Video Visit   Joined the call at 6/19/2024, 8:51:31 am.  Left the call at 6/19/2024, 9:24:23 am.  Originating Location (pt. Location): Home  Distant Location (provider location):  Off-site  Platform used for Video Visit: [employer]    6/19/2024    Referring Provider: Amairani Sanchez MD    Presenting Information:   I met with Martha Cruz today for genetic counseling as part of the Cancer Risk Management Program (virtual visit) to discuss her family history of breast pancreatic cancer.  She is here today to review this history, cancer screening recommendations, and available genetic testing options.    Personal History:  Martha is a 43 year old female. She does not have any personal history of cancer. She has her left ovary in place.  Her first period was at age 11, first child when 22, and reports being perimenopause.  Mammogram from 3/14/2024 was normal and she completed cologuard screening.      Family History: (Please see scanned pedigree for detailed family history information)  Her father was recently diagnosed with pancreatic cancer at age 70.  He is not completing treatment/genetic testing.    One paternal aunt was diagnosed with breast cancer when 50 and passed away at age 57  Her paternal grandfather was found to have pancreatic cancer (on autopsy), early 70's.    Maternal grandmother had breast cancer in her 60's-70's  Her maternal ethnicity is Faroese and Latvian. Her paternal ethnicity is Faroese, Kosovan and [place].      Discussion:  The features of sporadic, familial, and hereditary cancers were discussed, as it pertains to Martha's history of cancer. Martha's family history of breast and pancreatic cancer is suggestive of a hereditary cancer syndrome.  A detailed handout regarding hereditary cancer and the information we discussed can be found in the after visit summary. Topics included: inheritance pattern, cancer risks, cancer screening recommendations, as well  as risks, benefits and limitations of testing.  Martha meets current National Comprehensive Cancer Network (NCCN) criteria for genetic testing of hereditary breast and ovarian cancer syndrome (BRCA1 and BRCA2).    We discussed that there are additional genes that could cause increased risk for the cancers in Martha's family. As many of these genes present with overlapping features in a family and accurate cancer risk cannot always be established based upon the pedigree analysis alone, it would be reasonable for Martha to consider panel genetic testing to analyze multiple genes at once.  The information from genetic testing may determine additional cancer screening and risk management options, including earlier/more frequent imaging, and possible risk reducing surgeries.  For individuals with an active cancer diagnosis, the results from genetic testing may guide targeted therapies (i.e. immunotherapy for individuals with Yan syndrome, PARP inhibitors, etc.). These recommendations will be discussed in detail once genetic testing is completed.   An individual's personal and/or family history of cancer may be explained by more than one inherited cancer syndrome.  We reviewed available genetic testing options to address this history, including a tailored disease-focused panel, and expanded multi-cancer panels. Discussion included risks, benefits and limitations of testing.  Martha elected to proceed with genetic testing today.    The purpose of this genetic testing is to determine if Martha carries an inherited variant(s) associated with increased risk to develop cancer.  Genes included on this test may be associated with multiple types of cancer and are also associated with varying levels of cancer risk.  Depending on these cancer risks, specific screening and medical management recommendations may be available.  Possible results from testing typically include  positive  (pathogenic/likely pathogenic variant),  negative   (normal), or  variant of unknown significance  (VUS).  Genetic test results have implications for Martha's family. Should a variant be identified, close relatives may also have a risk to carry the genetic variant. Some of these genes may have additional reproductive risks and options for further testing.    There are federal laws in place that prohibit health insurers and employers from discriminating based on genetic information (for example, the Genetic Information Nondiscrimination Act (RORY) of 2008; some exceptions to apply.   This protection does not cover life insurance, long term care, or disability insurances.   Consent was obtained over virtual visit, and Martha elected to proceed with BRCA1/2 germline genetic testing with automatic reflex to the guidelines-based breast, gyn and pancreatic cancer panels. .  Accepted sample types include saliva and blood.  Test turn around time: 4 weeks.      Plan:  1) Martha elected to proceed with germline genetic testing, including a guidelines based gynecologic, breast and pancreatic cancers panel.  2) A blood draw will be scheduled at an [facility] clinic    3) Martha will be contacted once testing is completed to schedule a return genetic counseling visit, in which the results from testing and medical management recommendations will be discussed.  Test turn around time: approximately 4 weeks.        Megan Hoffman MS, [facility] [place]  Licensed, Certified Genetic Counselor

## 2024-06-19 NOTE — PATIENT INSTRUCTIONS
Assessing Cancer Risk  Cancer is a common diagnosis which impacts many families.  Individuals may develop cancer due to environmental factors (such as exposures and lifestyle), aging, genetic predisposition, or a combination of these factors.  The vast majority of cancer diagnoses are considered sporadic, and not primarily due to an inherited factor. Approximately 5-10% of cancer diagnoses are thought to be caused by inherited risk factors.       Many of the genes we are born with impact our risk of certain diseases, such as cancer.  When one of these genes is not working properly due to a mistake (known as a  mutation  or  variant ), this may lead to an increased risk of developing cancer.      Families impacted by a hereditary cancer syndrome are more likely to have relatives across several generations diagnosed with cancer, earlier ages of diagnoses (prior to age 50), certain rare tumors, or relatives diagnosed with multiple primary cancers.  However, this may not be the case for all families which carry a cancer risk gene, such as those with small family size or limited history information.         Genetic Testing  For some families, genetic testing may help to explain why their cancer developed, provide tailored management options, and clarify the risk of developing cancer in the future.      Genetic testing involves a simple blood or saliva test and will look at the genetic information in select genes for variants associated with cancer risk.  This testing may include analysis of a single gene due to a known variant in the family, multiple genes most associated with the cancers in a family, or an expanded panel of genes related to many types of cancers.    Results  There are several possible genetic test results, including:   Positive--a harmful mutation (also known as a  pathogenic  or  likely pathogenic  variant) was identified in a gene associated with increased cancer risk.  These risks, as well as  medical management options, depend on the specific genetic variant identified.    Negative--no variants were identified in the genes analyzed   Variant of unknown significance--a variant was identified in one or more genes, though it is currently unclear how this impacts cancer risk in the family.  Genetic testing labs are working to collect evidence about these uncertain variants and may provide updates in the future.    Medical Management  If a harmful mutation is found in a cancer risk gene, there may be increased cancer surveillance or preventative surgeries that can be offered. This information will be discussed after genetic testing is completed. If no mutations are found on genetic testing, screening is often recommended based on personal and/or family history of cancer. All cancer risk management options should be discussed in more detail with an individual's medical providers.    Inheritance   Variants in most cancer risk genes are inherited in an autosomal dominant pattern.  This means that if a parent has a variant, each of their children will have a 50% chance of inheriting that same variant.  Therefore, each child would have a 50% chance of being at increased risk for developing cancer.    Some cancer risk genes are inherited in an autosomal recessive pattern.  These risks are present when an individual inherits mutations from both parents in the same gene.         Genetic Information Nondiscrimination Act (RORY)  The Genetic Information Nondiscrimination Act of 2008 (RORY) is a federal law that protects individuals from health insurance or employment discrimination based on a genetic test result alone (with some exceptions, including employers with fewer than 15 employees, and ).  However, RORY's protection does not cover life insurance, long term care, or disability insurances.  The [facility] [employer] [place] is a great resource to learn more.    Questions to Think About  Regarding Genetic Testing  What effect will the test result have on me and my relationship with my family members if I have an inherited gene mutation?  If I don't have a gene mutation?  Should I share my test results, and how will my family react to this news, which may also affect them?  Are my children ready to learn new information that may one day affect their own health?    Please call us if you have any questions or concerns   (Appointments: [phone])    Cody Sawant, MS [facility] [place]  [phone]  Miladis Merino, MS, [facility] [phone]  Jaleesa Dominguez, MS, [facility]  [phone]  Megan Hoffman, MS, [facility]  [phone]  Araceli Bernard, MS, [facility]  [phone]  Alexa Hodges, MS, [facility]  [phone]  Paola Pool, MS, [facility]  [phone]

## 2024-06-24 ENCOUNTER — LAB (OUTPATIENT)
Dept: LAB | Facility: CLINIC | Age: 44
End: 2024-06-24
Payer: COMMERCIAL

## 2024-06-24 DIAGNOSIS — Z80.42 FAMILY HISTORY OF PROSTATE CANCER: ICD-10-CM

## 2024-06-24 DIAGNOSIS — Z80.3 FAMILY HISTORY OF MALIGNANT NEOPLASM OF BREAST: ICD-10-CM

## 2024-06-24 DIAGNOSIS — Z80.0 FAMILY HISTORY OF PANCREATIC CANCER: ICD-10-CM

## 2024-06-24 PROCEDURE — 99000 SPECIMEN HANDLING OFFICE-LAB: CPT

## 2024-06-24 PROCEDURE — 36415 COLL VENOUS BLD VENIPUNCTURE: CPT

## 2024-06-28 ENCOUNTER — HOSPITAL ENCOUNTER (EMERGENCY)
Facility: CLINIC | Age: 44
Discharge: HOME | End: 2024-06-28
Payer: COMMERCIAL

## 2024-06-28 VITALS
HEART RATE: 121 BPM | TEMPERATURE: 98.3 F | OXYGEN SATURATION: 99 % | SYSTOLIC BLOOD PRESSURE: 147 MMHG | DIASTOLIC BLOOD PRESSURE: 81 MMHG | RESPIRATION RATE: 16 BRPM

## 2024-06-28 DIAGNOSIS — J01.90 ACUTE BACTERIAL SINUSITIS: ICD-10-CM

## 2024-06-28 DIAGNOSIS — R09.81 NASAL CONGESTION: ICD-10-CM

## 2024-06-28 DIAGNOSIS — B96.89 ACUTE BACTERIAL SINUSITIS: ICD-10-CM

## 2024-06-28 PROCEDURE — G0463 HOSPITAL OUTPT CLINIC VISIT: HCPCS

## 2024-06-28 PROCEDURE — 99213 OFFICE O/P EST LOW 20 MIN: CPT

## 2024-06-28 RX ORDER — PREDNISONE 20 MG/1
TABLET ORAL
Qty: 10 TABLET | Refills: 0 | Status: SHIPPED | OUTPATIENT
Start: 2024-06-28

## 2024-06-28 ASSESSMENT — COLUMBIA-SUICIDE SEVERITY RATING SCALE - C-SSRS
6. HAVE YOU EVER DONE ANYTHING, STARTED TO DO ANYTHING, OR PREPARED TO DO ANYTHING TO END YOUR LIFE?: NO
1. IN THE PAST MONTH, HAVE YOU WISHED YOU WERE DEAD OR WISHED YOU COULD GO TO SLEEP AND NOT WAKE UP?: NO
2. HAVE YOU ACTUALLY HAD ANY THOUGHTS OF KILLING YOURSELF IN THE PAST MONTH?: NO
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
## 2024-06-28 NOTE — ED TRIAGE NOTES
Has had a cough and nasal drainage for 18 days and cannot get it to clear up on her own.  Pt declines covid and flu testing.  Sri Pastrana, CMA

## 2024-07-01 LAB — SCANNED LAB RESULT: NORMAL

## 2024-07-09 ENCOUNTER — VIRTUAL VISIT (OUTPATIENT)
Dept: ONCOLOGY | Facility: CLINIC | Age: 44
End: 2024-07-09
Attending: GENETIC COUNSELOR, MS
Payer: COMMERCIAL

## 2024-07-09 DIAGNOSIS — Z80.0 FAMILY HISTORY OF PANCREATIC CANCER: ICD-10-CM

## 2024-07-09 DIAGNOSIS — Z80.3 FAMILY HISTORY OF MALIGNANT NEOPLASM OF BREAST: Primary | ICD-10-CM

## 2024-07-09 PROCEDURE — 999N000069 HC STATISTIC GENETIC COUNSELING, < 16 MIN: Mod: GT,95 | Performed by: GENETIC COUNSELOR, MS

## 2024-07-09 NOTE — PATIENT INSTRUCTIONS
Negative Genetic Test Result    Genetic Testing  Genetic testing involved a blood or saliva test which looked at the genetic information in select genes for variants associated with cancer risk.  This testing may have included analysis of a single gene due to a known variant in the family, multiple genes most associated with the cancers in a family, or an expanded panel of genes related to many types of cancers.     Results  There are several possible genetic test results, including:   Positive--a harmful mutation (also known as a  pathogenic  or  likely pathogenic  variant) was identified in a gene associated with increased cancer risk.  These risks, as well as medical management options, depend on the specific genetic variant identified.    Negative--no variants were identified in the genes analyzed   Variant of unknown significance--a variant was identified in one or more genes, though it is currently unclear how this impacts cancer risk in the family.  Genetic testing labs are working to collect evidence about these uncertain variants and may provide updates in the future.    What Does a Negative Genetic Test Result Mean?  A negative genetic test results means that no genetic changes (variants) were detected in the genes tested. While no inherited risk factors for cancer were identified, you and your family may be at risk for certain cancers due to family history, environmental factors, or other genetic causes not identified by this test.  It is also possible that your family may still be at risk of carrying a genetic risk factor which you did not inherit. Your genetic counselor can help interpret the result for you and your relatives.      It is important to note which genes were included in your test. A list of these genes can be found on your test result.    Screening Recommendations  A combination of personal and family history factors may inform cancer risk and medical management recommendations.   Population cancer screening options, such as those recommended by the American Cancer Society and the National Comprehensive Cancer Network (NCCN) are appropriate for many families at average risk for cancer.  However, earlier and/or more frequent screening may be recommended based on personal factors (lifestyle, exposures, medications, screening results), family history of cancer, and sometimes genetic factors.  These cancer risk management options should be discussed in more detail with an individual's medical providers.      Please call us if you have any questions or concerns.   Cancer Risk Management Program (Appointments: [phone])  Cody Sawant, MS [facility] [place] [place]  [phone]  Miladis Merino, MS, [facility] [place] [place] [phone]  Jaleesa Dominguez, MS, [facility] [place] [place]  [phone]  Megan Hoffman, MS, [facility] [place] [place]  [phone]  Araceli Bernard, MS, [facility] [place] [place]  [phone]  Alexa Hodges, MS, [facility] [place] [place] [phone]  Paola Pool, MS, [facility] [place] [place] [phone]

## 2024-07-09 NOTE — NURSING NOTE
Current patient location: [address] [place] [id number]    Is the patient currently in the state of [place]? NO    Visit mode:VIDEO    If the visit is dropped, the patient can be reconnected by: VIDEO VISIT: Text to cell phone:   Telephone Information:   Mobile [phone]       Will anyone else be joining the visit? NO  (If patient encounters technical issues they should call [phone]    How would you like to obtain your AVS? MyChart    Are changes needed to the allergy or medication list? N/A    Are refills needed on medications prescribed by this physician? NO    Reason for visit: RECHECK    YONY CROCKETT

## 2024-07-09 NOTE — PROGRESS NOTES
"7/9/2024    Virtual Visit Details    Type of service:  Video Visit   Joined the call at 7/9/2024, 3:28:59 pm.  Left the call at 7/9/2024, 3:30:51 pm.  Originating Location (pt. Location): Home  Distant Location (provider location):  Off-site  Platform used for Video Visit: Sravan    Presenting Information:  Martha Cruz returned to the Cancer Risk Management Program for a follow up genetic counseling visit to discuss her genetic testing results. Please see previous genetic counseling note for additional details.      Genetic Testing Result: NEGATIVE  No pathogenic, likely pathogenic, or inconclusive variants were detected in any of the genes analyzed.  Testing included the [facility] Breast/Gyn Guidelines Panel and Pancreatic Cancer Panel     A copy of the test report can be found in the Laboratory tab, dated 6/24/2024, and named \"LABORATORY MISCELLANEOUS ORDER\". The report is scanned in as a linked document.    Interpretation:  Testing did not detect any disease-causing changes in the genes analyzed.  The vast majority of cancer diagnoses are considered sporadic, and not primarily due to an inherited factor. Sporadic cancers may be caused by a combination of factors including lifestyle, environmental exposures, certain medical conditions, and aging.  Most cancers occur by random chance, due to an accumulation of non-inherited genetic changes within our cells as we age.      We reviewed the possible limitations of our current testing technology.  It remains possible that a different gene, or combination of genetic and other risk factors may be present in Martha's family which could still contribute to increased cancer risks.        Martha completed genetic testing to address the family history of cancer:   Her father was recently diagnosed with pancreatic cancer at age 70.     One paternal aunt was diagnosed with breast cancer when 50 and passed away at age 57  Her paternal grandfather was found to have pancreatic " cancer (on autopsy), early 70's.    Maternal grandmother had breast cancer in her 60's-70's    It remains possible that these relatives may have carried a variant in the genes tested which Martha did not inherit.  Martha is encouraged update our clinic if any of these relatives do pursue genetic testing, as their results may change our risk assessment.     Screening:  Martha and her family may still have an increased risk of developing cancer based on other possible factors, including both personal and family history.  In general, population cancer screening options (including those recommended by the American Cancer Society and the National Comprehensive Cancer Network (NCCN), are appropriate.  Earlier and more frequent screening may be considered for some families.  These recommendations should be discussed with an individuals care team.     Specifically, we discussed that Martha may still remain at increased risk for pancreatic cancer.  Due to Martha s family history of pancreatic cancer, screening for pancreatic cancer may be considered. This screening typically involves endoscopic ultrasonography (EUS) and/or MRI/magnetic resonance cholangiopancreatography (Daren et al, Gut 2013. 62: 339-347; Brittany S, et al., Am J Gastroenterol 2015. 110:223-262). Given the significant limitations of this screening, Martha could consider meeting with her primary care provider to discuss this screening in more detail.     Mammogram from 3/14/2024 was normal, and she completed cologuard screening. Routine breast and colorectal cancer screening recommendations should be discussed with her managing physician.        These screening recommendations may change if there are changes to Martha's personal and/or family history of cancer. Final screening recommendations should be made by each individual's primary care provider.    Inheritance:  Variants in the genes tested can be inherited in an autosomal dominant manner. We discussed that  Martha cannot/did not pass on an identifiable mutation in these genes to her children based on this test result. Mutations in these genes do not skip generations.      Summary:  We do not have an explanation for Martha's family history of pancreatic and breast cancer. While no genetic changes were identified, Martha may still be at risk for certain cancers due to family history, environmental factors, or other genetic causes not identified by this test.  Because of that, it is important that she continue with cancer screening based on her personal and family history as discussed above.    Genetic testing is rapidly advancing, and new cancer susceptibility genes will most likely be identified in the future. Therefore, I encouraged Martha to contact me if there are changes in her personal or family history. This may change how we assess her cancer risk, screening, and the testing we would offer.    Summary:  1.  Germline genetic testing was NEGATIVE.    2. Cancer screening recommendations were reviewed based on personal and family history.    3. She should contact me periodically, or if her personal or family history of cancer changes, and she would like to know if there are additional screening or testing recommendations at that time.    Megan Hoffman MS, [facility] [place]  Licensed, Certified Genetic Counselor

## 2024-07-09 NOTE — LETTER
"7/9/2024      Martha Cruz  [address] [place] [id number]      Dear Colleague,    Thank you for referring your patient, Martha Cruz, to the [facility]. Please see a copy of my visit note below.    7/9/2024    Virtual Visit Details    Type of service:  Video Visit   Joined the call at 7/9/2024, 3:28:59 pm.  Left the call at 7/9/2024, 3:30:51 pm.  Originating Location (pt. Location): Home  Distant Location (provider location):  Off-site  Platform used for Video Visit: VersionEye    Presenting Information:  Martha Cruz returned to the [facility] Program for a follow up genetic counseling visit to discuss her genetic testing results. Please see previous genetic counseling note for additional details.      Genetic Testing Result: NEGATIVE  No pathogenic, likely pathogenic, or inconclusive variants were detected in any of the genes analyzed.  Testing included the [facility] Breast/Gyn Guidelines Panel and Pancreatic Cancer Panel     A copy of the test report can be found in the Laboratory tab, dated 6/24/2024, and named \"LABORATORY MISCELLANEOUS ORDER\". The report is scanned in as a linked document.    Interpretation:  Testing did not detect any disease-causing changes in the genes analyzed.  The vast majority of cancer diagnoses are considered sporadic, and not primarily due to an inherited factor. Sporadic cancers may be caused by a combination of factors including lifestyle, environmental exposures, certain medical conditions, and aging.  Most cancers occur by random chance, due to an accumulation of non-inherited genetic changes within our cells as we age.      We reviewed the possible limitations of our current testing technology.  It remains possible that a different gene, or combination of genetic and other risk factors may be present in Martha's family which could still contribute to increased cancer risks.        Martha completed genetic testing to address the " family history of cancer:   Her father was recently diagnosed with pancreatic cancer at age 70.     One paternal aunt was diagnosed with breast cancer when 50 and passed away at age 57  Her paternal grandfather was found to have pancreatic cancer (on autopsy), early 70's.    Maternal grandmother had breast cancer in her 60's-70's    It remains possible that these relatives may have carried a variant in the genes tested which Martha did not inherit.  Martha is encouraged update our clinic if any of these relatives do pursue genetic testing, as their results may change our risk assessment.     Screening:  Martha and her family may still have an increased risk of developing cancer based on other possible factors, including both personal and family history.  In general, population cancer screening options (including those recommended by the American Cancer Society and the National Comprehensive Cancer Network (NCCN), are appropriate.  Earlier and more frequent screening may be considered for some families.  These recommendations should be discussed with an individuals care team.     Specifically, we discussed that Martha may still remain at increased risk for pancreatic cancer.  Due to Martha s family history of pancreatic cancer, screening for pancreatic cancer may be considered. This screening typically involves endoscopic ultrasonography (EUS) and/or MRI/magnetic resonance cholangiopancreatography (Daren et al, Gut 2013. 62: 339-347; Chaparroal S, et al., Am J Gastroenterol 2015. 110:223-262). Given the significant limitations of this screening, Martha could consider meeting with her primary care provider to discuss this screening in more detail.     Mammogram from 3/14/2024 was normal, and she completed cologuard screening. Routine breast and colorectal cancer screening recommendations should be discussed with her managing physician.        These screening recommendations may change if there are changes to Martha's personal  and/or family history of cancer. Final screening recommendations should be made by each individual's primary care provider.    Inheritance:  Variants in the genes tested can be inherited in an autosomal dominant manner. We discussed that Martha cannot/did not pass on an identifiable mutation in these genes to her children based on this test result. Mutations in these genes do not skip generations.      Summary:  We do not have an explanation for Martha's family history of pancreatic and breast cancer. While no genetic changes were identified, Martha may still be at risk for certain cancers due to family history, environmental factors, or other genetic causes not identified by this test.  Because of that, it is important that she continue with cancer screening based on her personal and family history as discussed above.    Genetic testing is rapidly advancing, and new cancer susceptibility genes will most likely be identified in the future. Therefore, I encouraged Martha to contact me if there are changes in her personal or family history. This may change how we assess her cancer risk, screening, and the testing we would offer.    Summary:  1.  Germline genetic testing was NEGATIVE.    2. Cancer screening recommendations were reviewed based on personal and family history.    3. She should contact me periodically, or if her personal or family history of cancer changes, and she would like to know if there are additional screening or testing recommendations at that time.    Megan Hoffman MS, [facility]  Licensed, Certified Genetic Counselor            Again, thank you for allowing me to participate in the care of your patient.        Sincerely,        Megan Hoffman GC

## 2024-10-25 ENCOUNTER — OFFICE VISIT (OUTPATIENT)
Dept: FAMILY MEDICINE | Facility: CLINIC | Age: 44
End: 2024-10-25
Payer: COMMERCIAL

## 2024-10-25 VITALS
BODY MASS INDEX: 26.93 KG/M2 | OXYGEN SATURATION: 99 % | TEMPERATURE: 97.5 F | WEIGHT: 152 LBS | HEART RATE: 71 BPM | HEIGHT: 63 IN | SYSTOLIC BLOOD PRESSURE: 136 MMHG | DIASTOLIC BLOOD PRESSURE: 80 MMHG

## 2024-10-25 DIAGNOSIS — S61.412D LACERATION OF LEFT HAND WITHOUT FOREIGN BODY, SUBSEQUENT ENCOUNTER: Primary | ICD-10-CM

## 2024-10-25 PROCEDURE — 99213 OFFICE O/P EST LOW 20 MIN: CPT | Performed by: FAMILY MEDICINE

## 2024-10-25 NOTE — PROGRESS NOTES
"  Assessment & Plan     Laceration of left hand without foreign body, subsequent encounter  Healing well she is able to remove the stitches in about 5 days         MED REC REQUIRED  Post Medication Reconciliation Status: discharge medications reconciled, continue medications without change  BMI  Estimated body mass index is 26.93 kg/m  as calculated from the following:    Height as of this encounter: 1.6 m (5' 3\").    Weight as of this encounter: 68.9 kg (152 lb).         FUTURE APPOINTMENTS:       - Follow-up for annual visit or as needed    Stef Thomas is a 44 year old, presenting for the following health issues:  ER F/U        10/25/2024     3:16 PM   Additional Questions   Roomed by Eneida CAMPOS CMA     History of Present Illness       Reason for visit:  Hatchet hand injury, follow up from ER visit  Symptom onset:  3-7 days ago  Symptoms include:  Laceration  Symptom intensity:  Moderate  Symptom progression:  Staying the same  Had these symptoms before:  No She is missing 2 dose(s) of medications per week.  She is not taking prescribed medications regularly due to other.         ED/UC Followup:  Facility:  [facility]  Date of visit: 10/19/24  Reason for visit: Hand injury   Current Status: good      Healing wound with many stitches       Review of Systems  No fever or chills no drainage minimal pain       Objective    /80 (BP Location: Right arm, Patient Position: Sitting, Cuff Size: Adult Regular)   Pulse 71   Temp 97.5  F (36.4  C) (Tympanic)   Ht 1.6 m (5' 3\")   Wt 68.9 kg (152 lb)   LMP 12/17/2013   SpO2 99%   BMI 26.93 kg/m    Body mass index is 26.93 kg/m .  Physical Exam   GENERAL: alert and no distress  Left proximal index finger base  with well healing wound           Signed Electronically by: Amairani Sanchez MD    "

## 2025-03-25 ENCOUNTER — HOSPITAL ENCOUNTER (OUTPATIENT)
Dept: MAMMOGRAPHY | Facility: CLINIC | Age: 45
Discharge: HOME | End: 2025-03-25
Attending: FAMILY MEDICINE | Admitting: FAMILY MEDICINE
Payer: COMMERCIAL

## 2025-03-25 DIAGNOSIS — Z12.31 VISIT FOR SCREENING MAMMOGRAM: ICD-10-CM

## 2025-03-25 PROCEDURE — 77063 BREAST TOMOSYNTHESIS BI: CPT

## 2025-03-25 PROCEDURE — 77067 SCR MAMMO BI INCL CAD: CPT

## 2025-03-26 ENCOUNTER — ANCILLARY ORDERS (OUTPATIENT)
Dept: MAMMOGRAPHY | Facility: CLINIC | Age: 45
End: 2025-03-26
Payer: COMMERCIAL

## 2025-03-26 DIAGNOSIS — R92.8 ABNORMAL MAMMOGRAM: Primary | ICD-10-CM

## 2025-03-27 ENCOUNTER — ANCILLARY ORDERS (OUTPATIENT)
Dept: FAMILY MEDICINE | Facility: CLINIC | Age: 45
End: 2025-03-27
Payer: COMMERCIAL

## 2025-03-27 DIAGNOSIS — R92.8 ABNORMAL MAMMOGRAM: Primary | ICD-10-CM

## 2025-03-28 SDOH — HEALTH STABILITY: PHYSICAL HEALTH: ON AVERAGE, HOW MANY MINUTES DO YOU ENGAGE IN EXERCISE AT THIS LEVEL?: 40 MIN

## 2025-03-28 SDOH — HEALTH STABILITY: PHYSICAL HEALTH: ON AVERAGE, HOW MANY DAYS PER WEEK DO YOU ENGAGE IN MODERATE TO STRENUOUS EXERCISE (LIKE A BRISK WALK)?: 6 DAYS

## 2025-03-28 ASSESSMENT — SOCIAL DETERMINANTS OF HEALTH (SDOH): HOW OFTEN DO YOU GET TOGETHER WITH FRIENDS OR RELATIVES?: ONCE A WEEK

## 2025-04-01 ENCOUNTER — OFFICE VISIT (OUTPATIENT)
Dept: FAMILY MEDICINE | Facility: CLINIC | Age: 45
End: 2025-04-01
Payer: COMMERCIAL

## 2025-04-01 VITALS
DIASTOLIC BLOOD PRESSURE: 72 MMHG | OXYGEN SATURATION: 99 % | HEIGHT: 63 IN | BODY MASS INDEX: 27.11 KG/M2 | HEART RATE: 97 BPM | WEIGHT: 153 LBS | SYSTOLIC BLOOD PRESSURE: 132 MMHG | TEMPERATURE: 98.2 F

## 2025-04-01 DIAGNOSIS — Z00.00 ROUTINE GENERAL MEDICAL EXAMINATION AT A HEALTH CARE FACILITY: Primary | ICD-10-CM

## 2025-04-01 DIAGNOSIS — F43.23 ADJUSTMENT DISORDER WITH MIXED ANXIETY AND DEPRESSED MOOD: ICD-10-CM

## 2025-04-01 DIAGNOSIS — R92.8 ABNORMAL MAMMOGRAM: ICD-10-CM

## 2025-04-01 DIAGNOSIS — G43.109 MIGRAINE WITH AURA AND WITHOUT STATUS MIGRAINOSUS, NOT INTRACTABLE: ICD-10-CM

## 2025-04-01 PROCEDURE — 99396 PREV VISIT EST AGE 40-64: CPT | Mod: 25 | Performed by: FAMILY MEDICINE

## 2025-04-01 PROCEDURE — 3075F SYST BP GE 130 - 139MM HG: CPT | Performed by: FAMILY MEDICINE

## 2025-04-01 PROCEDURE — 99213 OFFICE O/P EST LOW 20 MIN: CPT | Mod: 25 | Performed by: FAMILY MEDICINE

## 2025-04-01 PROCEDURE — 3078F DIAST BP <80 MM HG: CPT | Performed by: FAMILY MEDICINE

## 2025-04-01 RX ORDER — FLUOXETINE 10 MG/1
10 CAPSULE ORAL DAILY
Qty: 90 CAPSULE | Refills: 3 | Status: SHIPPED | OUTPATIENT
Start: 2025-04-01

## 2025-04-01 RX ORDER — ONDANSETRON 8 MG/1
8 TABLET, ORALLY DISINTEGRATING ORAL EVERY 8 HOURS PRN
Qty: 20 TABLET | Refills: 3 | Status: SHIPPED | OUTPATIENT
Start: 2025-04-01

## 2025-04-01 RX ORDER — RIZATRIPTAN BENZOATE 10 MG/1
10 TABLET, ORALLY DISINTEGRATING ORAL
Qty: 9 TABLET | Refills: 3 | Status: SHIPPED | OUTPATIENT
Start: 2025-04-01

## 2025-04-01 ASSESSMENT — PATIENT HEALTH QUESTIONNAIRE - PHQ9
5. POOR APPETITE OR OVEREATING: NOT AT ALL
SUM OF ALL RESPONSES TO PHQ QUESTIONS 1-9: 2

## 2025-04-01 ASSESSMENT — ANXIETY QUESTIONNAIRES
6. BECOMING EASILY ANNOYED OR IRRITABLE: NOT AT ALL
GAD7 TOTAL SCORE: 1
2. NOT BEING ABLE TO STOP OR CONTROL WORRYING: NOT AT ALL
5. BEING SO RESTLESS THAT IT IS HARD TO SIT STILL: NOT AT ALL
3. WORRYING TOO MUCH ABOUT DIFFERENT THINGS: NOT AT ALL
7. FEELING AFRAID AS IF SOMETHING AWFUL MIGHT HAPPEN: NOT AT ALL
1. FEELING NERVOUS, ANXIOUS, OR ON EDGE: SEVERAL DAYS
GAD7 TOTAL SCORE: 1

## 2025-04-01 NOTE — PATIENT INSTRUCTIONS
Patient Education   Preventive Care Advice   This is general advice given by our system to help you stay healthy. However, your care team may have specific advice just for you. Please talk to your care team about your preventive care needs.  Nutrition  Eat 5 or more servings of fruits and vegetables each day.  Try wheat bread, brown rice and whole grain pasta (instead of white bread, rice, and pasta).  Get enough calcium and vitamin D. Check the label on foods and aim for 100% of the RDA (recommended daily allowance).  Lifestyle  Exercise at least 150 minutes each week  (30 minutes a day, 5 days a week).  Do muscle strengthening activities 2 days a week. These help control your weight and prevent disease.  No smoking.  Wear sunscreen to prevent skin cancer.  Have a dental exam and cleaning every 6 months.  Yearly exams  See your health care team every year to talk about:  Any changes in your health.  Any medicines your care team has prescribed.  Preventive care, family planning, and ways to prevent chronic diseases.  Shots (vaccines)   HPV shots (up to age 26), if you've never had them before.  Hepatitis B shots (up to age 59), if you've never had them before.  COVID-19 shot: Get this shot when it's due.  Flu shot: Get a flu shot every year.  Tetanus shot: Get a tetanus shot every 10 years.  Pneumococcal, hepatitis A, and RSV shots: Ask your care team if you need these based on your risk.  Shingles shot (for age 50 and up)  General health tests  Diabetes screening:  Starting at age 35, Get screened for diabetes at least every 3 years.  If you are younger than age 35, ask your care team if you should be screened for diabetes.  Cholesterol test: At age 39, start having a cholesterol test every 5 years, or more often if advised.  Bone density scan (DEXA): At age 50, ask your care team if you should have this scan for osteoporosis (brittle bones).  Hepatitis C: Get tested at least once in your life.  STIs (sexually  transmitted infections)  Before age 24: Ask your care team if you should be screened for STIs.  After age 24: Get screened for STIs if you're at risk. You are at risk for STIs (including HIV) if:  You are sexually active with more than one person.  You don't use condoms every time.  You or a partner was diagnosed with a sexually transmitted infection.  If you are at risk for HIV, ask about PrEP medicine to prevent HIV.  Get tested for HIV at least once in your life, whether you are at risk for HIV or not.  Cancer screening tests  Cervical cancer screening: If you have a cervix, begin getting regular cervical cancer screening tests starting at age 21.  Breast cancer scan (mammogram): If you've ever had breasts, begin having regular mammograms starting at age 40. This is a scan to check for breast cancer.  Colon cancer screening: It is important to start screening for colon cancer at age 45.  Have a colonoscopy test every 10 years (or more often if you're at risk) Or, ask your provider about stool tests like a FIT test every year or Cologuard test every 3 years.  To learn more about your testing options, visit:   .  For help making a decision, visit:   https://bit.ly/cb90041.  Prostate cancer screening test: If you have a prostate, ask your care team if a prostate cancer screening test (PSA) at age 55 is right for you.  Lung cancer screening: If you are a current or former smoker ages 50 to 80, ask your care team if ongoing lung cancer screenings are right for you.  For informational purposes only. Not to replace the advice of your health care provider. Copyright   2023 [facility]. All rights reserved. Clinically reviewed by the [facility]. [employer] 275395 - REV 01/24.  Learning About Stress  What is stress?     Stress is your body's response to a hard situation. Your body can have a physical, emotional, or mental response. Stress is a fact of life for most people, and it  affects everyone differently. What causes stress for you may not be stressful for someone else.  A lot of things can cause stress. You may feel stress when you go on a job interview, take a test, or run a race. This kind of short-term stress is normal and even useful. It can help you if you need to work hard or react quickly. For example, stress can help you finish an important job on time.  Long-term stress is caused by ongoing stressful situations or events. Examples of long-term stress include long-term health problems, ongoing problems at work, or conflicts in your family. Long-term stress can harm your health.  How does stress affect your health?  When you are stressed, your body responds as though you are in danger. It makes hormones that speed up your heart, make you breathe faster, and give you a burst of energy. This is called the fight-or-flight stress response. If the stress is over quickly, your body goes back to normal and no harm is done.  But if stress happens too often or lasts too long, it can have bad effects. Long-term stress can make you more likely to get sick, and it can make symptoms of some diseases worse. If you tense up when you are stressed, you may develop neck, shoulder, or low back pain. Stress is linked to high blood pressure and heart disease.  Stress also harms your emotional health. It can make you valentino, tense, or depressed. Your relationships may suffer, and you may not do well at work or school.  What can you do to manage stress?  You can try these things to help manage stress:   Do something active. Exercise or activity can help reduce stress. Walking is a great way to get started. Even everyday activities such as housecleaning or yard work can help.  Try yoga or phylicia chi. These techniques combine exercise and meditation. You may need some training at first to learn them.  Do something you enjoy. For example, listen to music or go to a movie. Practice your hobby or do volunteer  "work.  Meditate. This can help you relax, because you are not worrying about what happened before or what may happen in the future.  Do guided imagery. Imagine yourself in any setting that helps you feel calm. You can use online videos, books, or a teacher to guide you.  Do breathing exercises. For example:  From a standing position, bend forward from the waist with your knees slightly bent. Let your arms dangle close to the floor.  Breathe in slowly and deeply as you return to a standing position. Roll up slowly and lift your head last.  Hold your breath for just a few seconds in the standing position.  Breathe out slowly and bend forward from the waist.  Let your feelings out. Talk, laugh, cry, and express anger when you need to. Talking with supportive friends or family, a counselor, or a [employer]ona leader about your feelings is a healthy way to relieve stress. Avoid discussing your feelings with people who make you feel worse.  Write. It may help to write about things that are bothering you. This helps you find out how much stress you feel and what is causing it. When you know this, you can find better ways to cope.  What can you do to prevent stress?  You might try some of these things to help prevent stress:  Manage your time. This helps you find time to do the things you want and need to do.  Get enough sleep. Your body recovers from the stresses of the day while you are sleeping.  Get support. Your family, friends, and community can make a difference in how you experience stress.  Limit your news feed. Avoid or limit time on social media or news that may make you feel stressed.  Do something active. Exercise or activity can help reduce stress. Walking is a great way to get started.  Where can you learn more?  Go to https://www.InboxQ.net/patiented  Enter N032 in the search box to learn more about \"Learning About Stress.\"  Current as of: October 24, 2024  Content Version: 14.4 2024-2025 [employer] [facility] " [employer].   Care instructions adapted under license by your healthcare professional. If you have questions about a medical condition or this instruction, always ask your healthcare professional. [employer] disclaims any warranty or liability for your use of this information.

## 2025-04-01 NOTE — PROGRESS NOTES
"Preventive Care Visit  [facility] KENA Sanchez MD, Family Medicine  Apr 1, 2025      Assessment & Plan     Routine general medical examination at a health care facility      Migraine with aura and without status migrainosus, not intractable  She does well with the ondansetron especially for the headaches she will use some Tylenol or Advil at times and the rizatriptan is also needed  - amitriptyline (ELAVIL) 25 MG tablet; Take 1.5 tablets (37.5 mg) by mouth at bedtime. 1.5 tablets per day  - ondansetron (ZOFRAN ODT) 8 MG ODT tab; Take 1 tablet (8 mg) by mouth every 8 hours as needed for nausea. Take at onset of headache  - rizatriptan (MAXALT-MLT) 10 MG ODT; Take 1 tablet (10 mg) by mouth at onset of headache for migraine. May repeat in 2 hours. Max 3 tablets/24 hours.    Adjustment disorder with mixed anxiety and depressed mood  Very stable I am going to have her stay on this as her father passed away recently her mother just finished cancer therapy and now she has an abnormal mammogram  - FLUoxetine (PROZAC) 10 MG capsule; Take 1 capsule (10 mg) by mouth daily.    Abnormal mammogram  She will be scheduled for a biopsy based on the biopsy results and further treatment will be recommended by oncology    Patient has been advised of split billing requirements and indicates understanding: Yes        BMI  Estimated body mass index is 27.1 kg/m  as calculated from the following:    Height as of this encounter: 1.6 m (5' 3\").    Weight as of this encounter: 69.4 kg (153 lb).       Counseling  Appropriate preventive services were addressed with this patient via screening, questionnaire, or discussion as appropriate for fall prevention, nutrition, physical activity, Tobacco-use cessation, social engagement, weight loss and cognition.  Checklist reviewing preventive services available has been given to the patient.  Reviewed patient's diet, addressing concerns and/or questions.   She is at risk for " psychosocial distress and has been provided with information to reduce risk.       CONSULTATION/REFERRAL to oncology    Subjective   Martha is a 44 year old, presenting for the following:  Physical           HPI    Mom recent Cancer, Invasive Ducal carcinoma grade 2 estrongen ractor postive 8mm. HR Negative nuclear positivity  %  Dad passed away last year from pancreatic cncer      Depression and Anxiety   How are you doing with your depression since your last visit? No change  How are you doing with your anxiety since your last visit?  No change  Are you having other symptoms that might be associated with depression or anxiety? No  Have you had a significant life event? Grief or Loss and Health Concerns   Do you have any concerns with your use of alcohol or other drugs? No    Social History     Tobacco Use    Smoking status: Former     Current packs/day: 0.00     Average packs/day: 0.5 packs/day for 1.4 years (0.7 ttl pk-yrs)     Types: Cigarettes     Start date: 1997     Quit date: 1998     Years since quittin.8    Smokeless tobacco: Former   Substance Use Topics    Alcohol use: Yes     Comment: one cocktail every couple days    Drug use: No         2021    11:24 AM 11/15/2022     8:50 AM 3/1/2023     4:57 PM   PHQ   PHQ-9 Total Score 1 2 2   Q9: Thoughts of better off dead/self-harm past 2 weeks Not at all Not at all Not at all         2021    11:24 AM 11/15/2022     8:50 AM 3/1/2023     4:57 PM   JEAN-7 SCORE   Total Score  5 (mild anxiety)    Total Score 3 5 5         Suicide Assessment Five-step Evaluation and Treatment (SAFE-T)    Migraine   Since your last clinic visit, how have your headaches changed?  Worsened - seasonal change, currently on day 4  How often are you getting headaches or migraines? Every couple of weeks.    Are you able to do normal daily activities when you have a migraine? Yes  Are you taking rescue/relief medications? (Select all that apply) Maxalt  How helpful  is your rescue/relief medication?  I get some relief  Are you taking any medications to prevent migraines? (Select all that apply)  Amitriptyline  In the past 4 weeks, how often have you gone to urgent care or the emergency room because of your headaches?  0        Advance Care Planning  Patient does not have a Health Care Directive: Discussed advance care planning with patient; information given to patient to review.      3/28/2025   General Health   How would you rate your overall physical health? Good   Feel stress (tense, anxious, or unable to sleep) To some extent   (!) STRESS CONCERN      3/28/2025   Nutrition   Three or more servings of calcium each day? Yes   Diet: Regular (no restrictions)   How many servings of fruit and vegetables per day? (!) 0-1   How many sweetened beverages each day? 0-1         3/28/2025   Exercise   Days per week of moderate/strenous exercise 6 days   Average minutes spent exercising at this level 40 min         3/28/2025   Social Factors   Frequency of gathering with friends or relatives Once a week   Worry food won't last until get money to buy more No   Food not last or not have enough money for food? No   Do you have housing? (Housing is defined as stable permanent housing and does not include staying ouside in a car, in a tent, in an abandoned building, in an overnight shelter, or couch-surfing.) Yes   Are you worried about losing your housing? No   Lack of transportation? No   Unable to get utilities (heat,electricity)? No         3/28/2025   Dental   Dentist two times every year? Yes           3/13/2024   TB Screening   Were you born outside of the [place]? No           Today's PHQ-2 Score:       3/31/2025     8:00 AM   PHQ-2 ( 1999 [employer])   Q1: Little interest or pleasure in doing things 0   Q2: Feeling down, depressed or hopeless 0   PHQ-2 Score 0    Q1: Little interest or pleasure in doing things Not at all   Q2: Feeling down, depressed or hopeless Not at all   PHQ-2 Score 0        Patient-reported           3/28/2025   Substance Use   Alcohol more than 3/day or more than 7/wk No   Do you use any other substances recreationally? No     Social History     Tobacco Use    Smoking status: Former     Current packs/day: 0.00     Average packs/day: 0.5 packs/day for 1.4 years (0.7 ttl pk-yrs)     Types: Cigarettes     Start date: 1997     Quit date: 1998     Years since quittin.8    Smokeless tobacco: Former   Substance Use Topics    Alcohol use: Yes     Comment: one cocktail every couple days    Drug use: No          Headaches have been worse due to the weather changes  3/25/2025   LAST FHS-7 RESULTS   1st degree relative breast or ovarian cancer Yes   Any relative bilateral breast cancer Yes   Any male have breast cancer No   Any ONE woman have BOTH breast AND ovarian cancer Yes   Any woman with breast cancer before 50yrs Yes   2 or more relatives with breast AND/OR ovarian cancer Yes   2 or more relatives with breast AND/OR bowel cancer Yes        Mammogram Screening - Annual screen due to history of breast cancer, carcinoma in situ, or hyperplasia  Has seen       3/28/2025   STI Screening   New sexual partner(s) since last STI/HIV test? No     History of abnormal Pap smear: No - age 65 or older with adequate negative prior screening test results (3 consecutive negative cytology results, 2 consecutive negative cotesting results, or 2 consecutive negative HrHPV test results within 10 years, with the most recent test occurring within the recommended screening interval for the test used)        2013    12:00 AM   PAP / HPV   PAP (Historical) NIL      ASCVD Risk   The 10-year ASCVD risk score (Robert ANGEL, et al., 2019) is: 0.8%    Values used to calculate the score:      Age: 44 years      Sex: Female      Is Non- : No      Diabetic: No      Tobacco smoker: No      Systolic Blood Pressure: 136 mmHg      Is BP treated: No      HDL  "Cholesterol: 67 mg/dL      Total Cholesterol: 216 mg/dL       Reviewed and updated as needed this visit by Provider                             Objective    Exam  LMP 12/17/2013    Estimated body mass index is 26.93 kg/m  as calculated from the following:    Height as of 10/25/24: 1.6 m (5' 3\").    Weight as of 10/25/24: 68.9 kg (152 lb).    Physical Exam  GENERAL: alert and no distress  EYES: Eyes grossly normal to inspection, PERRL and conjunctivae and sclerae normal  HENT: ear canals and TM's normal, nose and mouth without ulcers or lesions  NECK: no adenopathy, no asymmetry, masses, or scars  RESP: lungs clear to auscultation - no rales, rhonchi or wheezes  CV: regular rate and rhythm, normal S1 S2, no S3 or S4, no murmur, click or rub, no peripheral edema   SKIN: no suspicious lesions or rashes  NEURO: Normal strength and tone, mentation intact and speech normal  PSYCH: mentation appears normal, affect normal/bright        Signed Electronically by: Amairani Sanchez MD    "

## 2025-04-17 ENCOUNTER — HOSPITAL ENCOUNTER (OUTPATIENT)
Dept: MAMMOGRAPHY | Facility: CLINIC | Age: 45
Discharge: HOME | End: 2025-04-17
Payer: COMMERCIAL

## 2025-04-17 DIAGNOSIS — R92.8 ABNORMAL MAMMOGRAM: ICD-10-CM

## 2025-04-17 PROCEDURE — 77065 DX MAMMO INCL CAD UNI: CPT | Mod: RT

## 2025-04-29 ENCOUNTER — VIRTUAL VISIT (OUTPATIENT)
Dept: ONCOLOGY | Facility: CLINIC | Age: 45
End: 2025-04-29
Attending: GENETIC COUNSELOR, MS
Payer: COMMERCIAL

## 2025-04-29 ENCOUNTER — PATIENT OUTREACH (OUTPATIENT)
Dept: ONCOLOGY | Facility: CLINIC | Age: 45
End: 2025-04-29

## 2025-04-29 DIAGNOSIS — Z80.0 FAMILY HISTORY OF PANCREATIC CANCER: ICD-10-CM

## 2025-04-29 DIAGNOSIS — Z80.3 FAMILY HISTORY OF MALIGNANT NEOPLASM OF BREAST: Primary | ICD-10-CM

## 2025-04-29 PROCEDURE — 999N000069 HC STATISTIC GENETIC COUNSELING, < 16 MIN: Mod: GT,95 | Performed by: GENETIC COUNSELOR, MS

## 2025-04-29 NOTE — PROGRESS NOTES
Virtual Visit Details    Type of service:  Video Visit   Joined the call at 4/29/2025, 10:17:14 am.  Left the call at 4/29/2025, 10:24:27 am.  Originating Location (pt. Location): Home  Distant Location (provider location):  Off-site  Platform used for Video Visit: Sravan Thomas returned to the Cancer Risk Management Program with family history updates and to revisit cancer screening recommendations.  Please see previous genetic counseling notes (including normal genetic test results) for additional details (visit dates 6/19/2024 and 7/9/2024).      Martha's father unfortunately passed away due to his pancreatic cancer diagnosis.  Shortly after, Martha's mother was diagnosed with breast cancer.    Previous genetic testing addressed [employer] Breast/Gyn Guidelines Panel and Pancreatic cancer Panel.  No further testing recommended today.        Based on her personal and family history, Martha has a 20.5% lifetime risk of developing breast cancer based on the IBISv8 model. As such, Martha meets current National Comprehensive Cancer Network (NCCN) guidelines for high risk breast screening. This includes annual breast MRI in addition to annual mammograms. In addition, Martha should be receiving clinical breast exams by her physician. We discussed that Martha could participate in our Cancer Risk Management Program in which our nursing specialist provides an individual screening plan and assists with medical management. A referral was made to see SAE Chong for this service.    In addition, Martha may also remain at increased risk for pancreatic cancer.  These screening options were previously discussed, including endoscopic ultrasonography (EUS) and/or MRI/magnetic resonance cholangiopancreatography     Genetic testing is rapidly advancing, and new cancer susceptibility genes will most likely be identified in the future. Therefore, I encouraged Martha to contact me if there are changes in her personal or family  history. This may change how we assess her cancer risk, screening, and the testing we would offer.     Referral placed for increased breast screening with PATTI Chong-ADAN Hoffman MS, [facility] [place]ensed, Certified Genetic Counselor  [facility]  Phone: [phone]    Total time spent for this visit, documentation and prep: 15 minutes

## 2025-04-29 NOTE — NURSING NOTE
Current patient location:  [place], [place]      Is the patient currently in the state of [place]? YES    Visit mode:VIDEO    If the visit is dropped, the patient can be reconnected by: VIDEO VISIT: Text to cell phone:   Telephone Information:   Mobile [phone]       Will anyone else be joining the visit? NO  (If patient encounters technical issues they should call [phone]    How would you like to obtain your AVS? MyChart    Are changes needed to the allergy or medication list? N/A    Reason for visit:   Berna CROCKETT

## 2025-04-29 NOTE — PROGRESS NOTES
Writer received referral to Cancer Risk Management/Genetic Counseling.    Referred for:    referral for PATTI Chong-CNS or high risk breast screening provider, increased risk based on family history    Referred By    Provider Department Location Phone   Megan Hoffman GC  Cancer Risk Mgmt [facility] [phone]       Referral reviewed for appropriate plan, and sent to New Patient Scheduling ([phone]) for completion.    Samantha Griffin, RN, BSN  Oncology New Patient Nurse Navigator   [facility]

## 2025-04-29 NOTE — LETTER
4/29/2025      Martha Cruz  [address] [place] [id number]      Dear Colleague,    Thank you for referring your patient, Martha Cruz, to the [facility]. Please see a copy of my visit note below.    Virtual Visit Details    Type of service:  Video Visit   Joined the call at 4/29/2025, 10:17:14 am.  Left the call at 4/29/2025, 10:24:27 am.  Originating Location (pt. Location): Home  Distant Location (provider location):  Off-site  Platform used for Video Visit: Sravan Thomas returned to the Cancer Risk Management Program with family history updates and to revisit cancer screening recommendations.  Please see previous genetic counseling notes (including normal genetic test results) for additional details (visit dates 6/19/2024 and 7/9/2024).      Martha's father unfortunately passed away due to his pancreatic cancer diagnosis.  Shortly after, Martha's mother was diagnosed with breast cancer.    Previous genetic testing addressed [employer] Breast/Gyn Guidelines Panel and Pancreatic cancer Panel.  No further testing recommended today.        Based on her personal and family history, Martha has a 20.5% lifetime risk of developing breast cancer based on the IBISv8 model. As such, Martha meets current National Comprehensive Cancer Network (NCCN) guidelines for high risk breast screening. This includes annual breast MRI in addition to annual mammograms. In addition, Martha should be receiving clinical breast exams by her physician. We discussed that Martha could participate in our Cancer Risk Management Program in which our nursing specialist provides an individual screening plan and assists with medical management. A referral was made to see SAE Chong for this service.    In addition, Martha may also remain at increased risk for pancreatic cancer.  These screening options were previously discussed, including endoscopic ultrasonography (EUS) and/or MRI/magnetic resonance  cholangiopancreatography     Genetic testing is rapidly advancing, and new cancer susceptibility genes will most likely be identified in the future. Therefore, I encouraged Martha to contact me if there are changes in her personal or family history. This may change how we assess her cancer risk, screening, and the testing we would offer.     Referral placed for increased breast screening with SAE Chong MS, [facility] [place]  Licensed, Certified Genetic Counselor  [facility]  Phone: [phone]    Total time spent for this visit, documentation and prep: 15 minutes      Again, thank you for allowing me to participate in the care of your patient.        Sincerely,        Megan Hoffman GC    Electronically signed
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
## 2025-05-14 ENCOUNTER — ONCOLOGY VISIT (OUTPATIENT)
Dept: ONCOLOGY | Facility: CLINIC | Age: 45
End: 2025-05-14
Attending: GENETIC COUNSELOR, MS
Payer: COMMERCIAL

## 2025-05-14 VITALS
BODY MASS INDEX: 26.84 KG/M2 | SYSTOLIC BLOOD PRESSURE: 119 MMHG | HEART RATE: 92 BPM | DIASTOLIC BLOOD PRESSURE: 78 MMHG | TEMPERATURE: 98.3 F | RESPIRATION RATE: 18 BRPM | OXYGEN SATURATION: 97 % | HEIGHT: 63 IN | WEIGHT: 151.5 LBS

## 2025-05-14 DIAGNOSIS — Z80.3 FAMILY HISTORY OF MALIGNANT NEOPLASM OF BREAST: ICD-10-CM

## 2025-05-14 DIAGNOSIS — Z80.0 FAMILY HISTORY OF PANCREATIC CANCER: ICD-10-CM

## 2025-05-14 DIAGNOSIS — Z91.89 AT HIGH RISK FOR BREAST CANCER: Primary | ICD-10-CM

## 2025-05-14 PROCEDURE — 99215 OFFICE O/P EST HI 40 MIN: CPT

## 2025-05-14 PROCEDURE — 99213 OFFICE O/P EST LOW 20 MIN: CPT

## 2025-05-14 ASSESSMENT — PAIN SCALES - GENERAL: PAINLEVEL_OUTOF10: NO PAIN (0)

## 2025-05-14 NOTE — PATIENT INSTRUCTIONS
Individualized Surveillance Plan for women  With 20% or greater lifetime risk of breast cancer   Per NCCN Breast Cancer Screening and Diagnosis Guidelines Version 2.2024   Recommended screening Test or procedure Last done Next Scheduled    Clinical encounter Clinical exam every 6-12 months.   Refer to genetic counseling if not already done.  Consider risk reduction strategies.   May 2025   March 2026   However, some family histories with breast cancers at a very young age, may warrant screening starting earlier.    *May begin at age 40 if breast cancers in the family occur at later ages.    Annual mammogram beginning 10 years younger than the earliest breast cancer in the family but not prior to age 30.    Recommend annual breast MRI to begin 10 years younger than the earliest breast cancer in the family but not prior to age 25.    Breast MRIs are preferably done on day 7-15 of the menstrual cycle in premenopausal women.   3/25/2025: Screening tomosynthesis mammogram, BiRads0    4/17/2025: Diagnostic right breast mammogram, BiRads2   Breast MRI in October    Return to clinic in March, 2026 with a mammogram following our visit   Breast screening for patients at high risk due to thoracic radiation between the ages of 10-30   Annual clinical exam beginning 8 years after radiation therapy.    Annual screening mammogram beginning at age 30 or 8 years after radiation therapy    Annual breast MRI, beginning at age 25 or 8 years after radiation therapy.     NA   NA   Women who have a lifetime risk of >20% based on history of LCIS or ADH/ALH Annual screening mammogram beginning at age of LCIS or ADH/ALH but not prior to age 30.    Consider annual MRI to begin at age of diagnosis of LCIS or ADH/ALH but not prior to age 25.    Strongly recommend risk reducing strategies if possible   NA   NA    Recommend risk reducing strategies for women with 1.7% 5 year risk of breast cancer.
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
## 2025-05-14 NOTE — NURSING NOTE
"Oncology Rooming Note    May 14, 2025 1:20 PM   Martha Cruz is a 44 year old female who presents for:    Chief Complaint   Patient presents with    Oncology Clinic Visit     Family history of malignant neoplasm of breast     Initial Vitals: /78 (BP Location: Right arm, Patient Position: Sitting, Cuff Size: Adult Regular)   Pulse 92   Temp 98.3  F (36.8  C) (Oral)   Resp 18   Ht 1.601 m (5' 3.03\")   Wt 68.7 kg (151 lb 8 oz)   LMP 12/17/2013   SpO2 97%   BMI 26.81 kg/m   Estimated body mass index is 26.81 kg/m  as calculated from the following:    Height as of this encounter: 1.601 m (5' 3.03\").    Weight as of this encounter: 68.7 kg (151 lb 8 oz). Body surface area is 1.75 meters squared.  No Pain (0) Comment: Data Unavailable   Patient's last menstrual period was 12/17/2013.  Allergies reviewed: Yes  Medications reviewed: Yes    Medications: Medication refills not needed today.  Pharmacy name entered into [employer]:    [facility] - [place] - [address] [facility] [place], [place] - [address] [address]    Frailty Screening:   Is the patient here for a new oncology consult visit in cancer care? 1. Yes. Over the past month, have you experienced difficulty or required a caregiver to assist with:   1. Balance, walking or general mobility (including any falls)? NO  2. Completion of self-care tasks such as bathing, dressing, toileting, grooming/hygiene?  NO  3. Concentration or memory that affects your daily life?  NO     PHQ9:  Did this patient require a PHQ9?: No      Clinical concerns: none.       Samantha Edmonds"